# Patient Record
Sex: FEMALE | Race: WHITE | NOT HISPANIC OR LATINO | Employment: UNEMPLOYED | URBAN - METROPOLITAN AREA
[De-identification: names, ages, dates, MRNs, and addresses within clinical notes are randomized per-mention and may not be internally consistent; named-entity substitution may affect disease eponyms.]

---

## 2017-11-24 ENCOUNTER — OFFICE VISIT (OUTPATIENT)
Dept: URGENT CARE | Facility: CLINIC | Age: 22
End: 2017-11-24
Payer: COMMERCIAL

## 2017-11-24 PROCEDURE — 99213 OFFICE O/P EST LOW 20 MIN: CPT

## 2017-12-05 NOTE — PROGRESS NOTES
Assessment    1  Acute otitis media (382 9) (N78 70)    Plan  Acute otitis media    · Amoxicillin 875 MG Oral Tablet; take 1 tablet every twelve hours  Unlinked    · Anti-Diarrheal 2 MG Oral Tablet   · NyQuil LIQD   · Robitussin Cough/Cold CF LIQD   · Theraflu Severe Cold Daytime 15-5-325 MG Oral Tablet   · Ventolin 90 MCG/ACT AERS (Albuterol)    Discussion/Summary  Medication Side Effects Reviewed: Possible side effects of new medications were reviewed with the patient/guardian today  Understands and agrees with treatment plan: The treatment plan was reviewed with the patient/guardian  The patient/guardian understands and agrees with the treatment plan   Counseling Documentation With Imm: The patient, patient's family was counseled regarding instructions for management  Chief Complaint    1  Ear Pain  Chief Complaint Free Text Note Form: C/O soreness in left ear and difficulty hearing from her left ear x 2 weeks  History of Present Illness  HPI: Started with left ear pain and muffled hearing 2 weeks ago  Also having runny nose and productive cough with green mucous  no fever or chills  Hospital Based Practices Required Assessment:   Pain Assessment   the patient states they do not have pain  (on a scale of 0 to 10, the patient rates the pain at 0 )   Abuse And Domestic Violence Screen   Domestic violence screen not done today  Reason DV Screen not done: mother present    Ear Pain: Lisbeth Cummings presents with complaints of ear pain  Associated symptoms include ear plugging, ear pressure, decreased hearing, auricular pain and cough, but no ear drainage, no ear sores, no ear pruritus, no fever, no nasal congestion, no sore throat, no swollen glands, no facial pain, no headache, no tinnitus, no vertigo, no loss of balance and no nausea  Review of Systems  Focused-Female:   Constitutional: no fever and no chills  ENT: no hoarseness  Cardiovascular: no chest pain     Respiratory: no shortness of breath and no wheezing  Gastrointestinal: no abdominal pain, no nausea and no vomiting  Musculoskeletal: no myalgias  Integumentary: no rashes  Neurological: no headache and no dizziness  ROS Reviewed:   ROS reviewed  Past Medical History    1  History of Dysuria (788 1) (R30 0)   2  History of Dysuria (788 1) (R30 0)   3  History of abdominal pain (V13 89) (Z87 898)   4  History of abdominal pain (V13 89) (Z87 898)   5  History of urinary frequency (V13 09) (Z87 898)   6  Nausea (787 02) (R11 0)  Active Problems And Past Medical History Reviewed: The active problems and past medical history were reviewed and updated today  Family History  Family History    1  Family history of Crohn's Disease   2  Family history of Diabetes Mellitus (V18 0)   3  Family history of Hypertension (V17 49)    Social History    · Denied: History of Alcohol Use (History)   · Never A Smoker  Social History Reviewed: The social history was reviewed and updated today  Surgical History    1  History of Denial Of Any Significant Medical History    Current Meds   1  Anti-Diarrheal 2 MG Oral Tablet; Therapy: (Recorded:02Nov2016) to Recorded   2  CeleXA TABS; Therapy: (Recorded:24Nov2017) to Recorded   3  NuvaRing RING; Therapy: (Recorded:24Apr2014) to Recorded   4  NyQuil LIQD;   Therapy: (Recorded:02Nov2016) to Recorded   5  Robitussin Cough/Cold CF LIQD;   Therapy: (Recorded:02Nov2016) to Recorded   6  Theraflu Severe Cold Daytime 15-5-325 MG Oral Tablet; Therapy: (Recorded:02Nov2016) to Recorded   7  Ventolin 90 MCG/ACT AERS; Therapy: (Recorded:02Nov2016) to Recorded  Medication List Reviewed: The medication list was reviewed and updated today  Allergies    1   No Known Drug Allergies    Vitals  Signs   Recorded: [id number] 02:12PM   Temperature: 99 7 F  Heart Rate: 98  Respiration: 20  Systolic: 369  Diastolic: 60  Weight: 933 lb   O2 Saturation: 99  Pain Scale: 0    Physical Exam    Constitutional   General appearance: No acute distress, well appearing and well nourished  Eyes   Conjunctiva and lids: No swelling, erythema or discharge  Ears, Nose, Mouth, and Throat   External inspection of ears and nose: Normal     Otoscopic examination: Abnormal   The right tympanic membrane was red and had a diminished light reflex  The left tympanic membrane was red and had a diminished light reflex  The right external canal was normal  The left external canal was normal    Nasal mucosa, septum, and turbinates: Normal without edema or erythema  Oropharynx: Normal with no erythema, edema, exudate or lesions  Pulmonary   Respiratory effort: No increased work of breathing or signs of respiratory distress  Auscultation of lungs: Clear to auscultation  Cardiovascular   Auscultation of heart: Normal rate and rhythm, normal S1 and S2, without murmurs  Lymphatic   Palpation of lymph nodes in neck: No lymphadenopathy  Musculoskeletal   Gait and station: Normal     Skin   Skin and subcutaneous tissue: Normal without rashes or lesions      Psychiatric   Mood and affect: Normal        Signatures   Electronically signed by : CHALINO Coffman; Nov 24 2017  2:23PM EST                       (Author)

## 2018-04-24 LAB
25(OH)D3 SERPL-MCNC: 35.18 NG/ML
ALBUMIN SERPL BCP-MCNC: 4.1 G/DL (ref 3.5–5.7)
ALP SERPL-CCNC: 45 IU/L (ref 40–150)
ALT SERPL W P-5'-P-CCNC: 19 IU/L (ref 0–50)
ANION GAP SERPL CALCULATED.3IONS-SCNC: 12.1 MM/L
AST SERPL W P-5'-P-CCNC: 18 U/L (ref 7–26)
BASOPHILS # BLD AUTO: 0 X3/UL (ref 0–0.3)
BASOPHILS # BLD AUTO: 0.2 % (ref 0–2)
BILIRUB SERPL-MCNC: 0.5 MG/DL (ref 0.3–1)
BUN SERPL-MCNC: 13 MG/DL (ref 7–25)
CALCIUM SERPL-MCNC: 9.4 MG/DL (ref 8.6–10.5)
CHLORIDE SERPL-SCNC: 106 MM/L (ref 98–107)
CO2 SERPL-SCNC: 25 MM/L (ref 21–31)
CREAT SERPL-MCNC: 0.64 MG/DL (ref 0.6–1.2)
DEPRECATED RDW RBC AUTO: 18.5 %
EGFR (HISTORICAL): > 60 GFR
EGFR AFRICAN AMERICAN (HISTORICAL): > 60 GFR
EOSINOPHIL # BLD AUTO: 0 X3/UL (ref 0–0.5)
EOSINOPHIL NFR BLD AUTO: 1.2 % (ref 0–5)
FERRITIN SERPL-MCNC: 6.3 NG/ML (ref 22–322)
FOLATE SERPL-MCNC: > 20 NG/ML (ref 0.5–20)
GLUCOSE (HISTORICAL): 63 MG/DL (ref 65–99)
HCT VFR BLD AUTO: 33.9 % (ref 37–47)
HGB BLD-MCNC: 11.2 G/DL (ref 12–16)
IRON SERPL-MCNC: 49 UG/DL (ref 50–212)
LYMPHOCYTES # BLD AUTO: 1 X3/UL (ref 1.2–4.2)
LYMPHOCYTES NFR BLD AUTO: 27.8 % (ref 20.5–51.1)
MCH RBC QN AUTO: 21.9 PG (ref 26–34)
MCHC RBC AUTO-ENTMCNC: 33.1 G/DL (ref 31–37)
MCV RBC AUTO: 66.2 FL (ref 81–99)
MICROCYTOSIS (HISTORICAL): ABNORMAL
MONOCYTES # BLD AUTO: 0.3 X3/UL (ref 0–1)
MONOCYTES NFR BLD AUTO: 9.7 % (ref 1.7–12)
NEUTROPHILS # BLD AUTO: 2.2 X3/UL (ref 1.4–6.5)
NEUTS SEG NFR BLD AUTO: 61.1 % (ref 42.2–75.2)
OSMOLALITY, SERUM (HISTORICAL): 278 MOSM (ref 262–291)
OVALOCYTOSIS (HISTORICAL): SLIGHT
PLATELET # BLD AUTO: 127 X3/UL (ref 130–400)
PMV BLD AUTO: 8.5 FL
POTASSIUM SERPL-SCNC: 3.1 MM/L (ref 3.5–5.5)
RBC # BLD AUTO: 5.12 X6/UL (ref 3.9–5.2)
SODIUM SERPL-SCNC: 140 MM/L (ref 134–143)
TOTAL PROTEIN (HISTORICAL): 6.8 G/DL (ref 6.4–8.9)
VIT B12 SERPL-MCNC: 179 PG/ML (ref 180–914)
WBC # BLD AUTO: 3.5 X3/UL (ref 4.8–10.8)

## 2018-04-25 LAB — PTH-INTACT SERPL-MCNC: 16 PG/ML (ref 15–65)

## 2018-04-26 LAB — ZINC (HISTORICAL): 71 UG/DL (ref 56–134)

## 2018-04-28 LAB — VITAMIN B1, WHOLE BLOOD (HISTORICAL): 110.1 NMOL/ (ref 66.5–200)

## 2018-05-01 LAB — VITAMIN A LEVEL (HISTORICAL): 49.6 UG/DL (ref 31.2–89.1)

## 2019-01-21 ENCOUNTER — APPOINTMENT (OUTPATIENT)
Dept: LAB | Facility: HOSPITAL | Age: 24
End: 2019-01-21
Payer: COMMERCIAL

## 2019-01-21 ENCOUNTER — TRANSCRIBE ORDERS (OUTPATIENT)
Dept: ADMINISTRATIVE | Facility: HOSPITAL | Age: 24
End: 2019-01-21

## 2019-01-21 DIAGNOSIS — D69.6 THROMBOCYTOPENIA, UNSPECIFIED (HCC): ICD-10-CM

## 2019-01-21 DIAGNOSIS — D69.6 THROMBOCYTOPENIA, UNSPECIFIED (HCC): Primary | ICD-10-CM

## 2019-01-21 LAB
EOSINOPHIL # BLD AUTO: 0.05 THOUSAND/UL (ref 0–0.61)
EOSINOPHIL NFR BLD MANUAL: 2 % (ref 0–6)
ERYTHROCYTE [DISTWIDTH] IN BLOOD BY AUTOMATED COUNT: 15.2 % (ref 11.6–15.1)
HCT VFR BLD AUTO: 33.5 % (ref 37–47)
HGB BLD-MCNC: 10.8 G/DL (ref 11.5–15.4)
LYMPHOCYTES # BLD AUTO: 1.01 THOUSAND/UL (ref 0.6–4.47)
LYMPHOCYTES # BLD AUTO: 44 % (ref 20–51)
MCH RBC QN AUTO: 22.5 PG (ref 26.8–34.3)
MCHC RBC AUTO-ENTMCNC: 32.3 G/DL (ref 31.4–37.4)
MCV RBC AUTO: 70 FL (ref 82–98)
MICROCYTES BLD QL AUTO: PRESENT
MONOCYTES # BLD AUTO: 0.14 THOUSAND/UL (ref 0–1.22)
MONOCYTES NFR BLD AUTO: 6 % (ref 4–12)
NEUTS BAND NFR BLD MANUAL: 4 % (ref 0–8)
NEUTS SEG # BLD: 1.1 THOUSAND/UL (ref 1.81–6.82)
NEUTS SEG NFR BLD AUTO: 44 % (ref 42–75)
NRBC BLD AUTO-RTO: 0 /100 WBCS
OVALOCYTES BLD QL SMEAR: PRESENT
PLATELET # BLD AUTO: 116 THOUSANDS/UL (ref 149–390)
PLATELET BLD QL SMEAR: ABNORMAL
PMV BLD AUTO: 7.3 FL (ref 8.9–12.7)
RBC # BLD AUTO: 4.81 MILLION/UL (ref 3.81–5.12)
TOTAL CELLS COUNTED SPEC: 100
WBC # BLD AUTO: 2.3 THOUSAND/UL (ref 4.31–10.16)

## 2019-01-21 PROCEDURE — 85027 COMPLETE CBC AUTOMATED: CPT

## 2019-01-21 PROCEDURE — 85007 BL SMEAR W/DIFF WBC COUNT: CPT

## 2019-01-21 PROCEDURE — 36415 COLL VENOUS BLD VENIPUNCTURE: CPT

## 2019-03-23 ENCOUNTER — TRANSCRIBE ORDERS (OUTPATIENT)
Dept: ADMINISTRATIVE | Facility: HOSPITAL | Age: 24
End: 2019-03-23

## 2019-03-23 ENCOUNTER — APPOINTMENT (OUTPATIENT)
Dept: LAB | Facility: HOSPITAL | Age: 24
End: 2019-03-23
Payer: COMMERCIAL

## 2019-03-23 DIAGNOSIS — R16.1 SPLENOMEGALY: Primary | ICD-10-CM

## 2019-03-23 DIAGNOSIS — R16.1 SPLENOMEGALY: ICD-10-CM

## 2019-03-23 DIAGNOSIS — K76.0 FATTY METAMORPHOSIS OF LIVER: ICD-10-CM

## 2019-03-23 LAB
ALBUMIN SERPL BCP-MCNC: 3.9 G/DL (ref 3.5–5.7)
ALP SERPL-CCNC: 61 U/L (ref 40–150)
ALT SERPL W P-5'-P-CCNC: 25 U/L (ref 7–52)
ANION GAP SERPL CALCULATED.3IONS-SCNC: 9 MMOL/L (ref 4–13)
ANISOCYTOSIS BLD QL SMEAR: PRESENT
AST SERPL W P-5'-P-CCNC: 21 U/L (ref 13–39)
BILIRUB SERPL-MCNC: 0.5 MG/DL (ref 0.2–1)
BUN SERPL-MCNC: 13 MG/DL (ref 7–25)
CALCIUM SERPL-MCNC: 9.3 MG/DL (ref 8.6–10.5)
CHLORIDE SERPL-SCNC: 105 MMOL/L (ref 98–107)
CO2 SERPL-SCNC: 27 MMOL/L (ref 21–31)
CREAT SERPL-MCNC: 0.68 MG/DL (ref 0.6–1.2)
EOSINOPHIL # BLD AUTO: 0.04 THOUSAND/UL (ref 0–0.61)
EOSINOPHIL NFR BLD MANUAL: 2 % (ref 0–6)
ERYTHROCYTE [DISTWIDTH] IN BLOOD BY AUTOMATED COUNT: 21.3 % (ref 11.5–14.5)
FERRITIN SERPL-MCNC: 182 NG/ML (ref 8–388)
GFR SERPL CREATININE-BSD FRML MDRD: 125 ML/MIN/1.73SQ M
GLUCOSE SERPL-MCNC: 94 MG/DL (ref 65–99)
HCT VFR BLD AUTO: 40.7 % (ref 42–47)
HGB BLD-MCNC: 13.4 G/DL (ref 12–16)
INR PPP: 1.09 (ref 0.9–1.5)
LYMPHOCYTES # BLD AUTO: 0.64 THOUSAND/UL (ref 0.6–4.47)
LYMPHOCYTES # BLD AUTO: 29 % (ref 20–51)
MCH RBC QN AUTO: 24.9 PG (ref 26–34)
MCHC RBC AUTO-ENTMCNC: 32.9 G/DL (ref 31–37)
MCV RBC AUTO: 76 FL (ref 81–99)
MONOCYTES # BLD AUTO: 0.22 THOUSAND/UL (ref 0–1.22)
MONOCYTES NFR BLD AUTO: 10 % (ref 4–12)
NEUTS SEG # BLD: 1.3 THOUSAND/UL (ref 1.81–6.82)
NEUTS SEG NFR BLD AUTO: 59 % (ref 42–75)
PLATELET # BLD AUTO: 97 THOUSANDS/UL (ref 149–390)
PLATELET BLD QL SMEAR: ABNORMAL
PMV BLD AUTO: 8.5 FL (ref 8.6–11.7)
POIKILOCYTOSIS BLD QL SMEAR: PRESENT
POTASSIUM SERPL-SCNC: 3.7 MMOL/L (ref 3.5–5.5)
PROT SERPL-MCNC: 6.5 G/DL (ref 6.4–8.9)
PROTHROMBIN TIME: 12.6 SECONDS (ref 10.2–13)
RBC # BLD AUTO: 5.37 MILLION/UL (ref 3.9–5.2)
RBC MORPH BLD: ABNORMAL
SCHISTOCYTES BLD QL SMEAR: PRESENT
SODIUM SERPL-SCNC: 141 MMOL/L (ref 134–143)
TIBC SERPL-MCNC: 418 UG/DL (ref 250–450)
TOTAL CELLS COUNTED SPEC: 100
VIT B12 SERPL-MCNC: 238 PG/ML (ref 100–900)
WBC # BLD AUTO: 2.2 THOUSAND/UL (ref 4.8–10.8)

## 2019-03-23 PROCEDURE — 82607 VITAMIN B-12: CPT

## 2019-03-23 PROCEDURE — 82728 ASSAY OF FERRITIN: CPT

## 2019-03-23 PROCEDURE — 82103 ALPHA-1-ANTITRYPSIN TOTAL: CPT

## 2019-03-23 PROCEDURE — 86235 NUCLEAR ANTIGEN ANTIBODY: CPT

## 2019-03-23 PROCEDURE — 85245 CLOT FACTOR VIII VW RISTOCTN: CPT

## 2019-03-23 PROCEDURE — 80053 COMPREHEN METABOLIC PANEL: CPT

## 2019-03-23 PROCEDURE — 86803 HEPATITIS C AB TEST: CPT

## 2019-03-23 PROCEDURE — 82390 ASSAY OF CERULOPLASMIN: CPT

## 2019-03-23 PROCEDURE — 36415 COLL VENOUS BLD VENIPUNCTURE: CPT

## 2019-03-23 PROCEDURE — 85610 PROTHROMBIN TIME: CPT

## 2019-03-23 PROCEDURE — 87340 HEPATITIS B SURFACE AG IA: CPT

## 2019-03-23 PROCEDURE — 86038 ANTINUCLEAR ANTIBODIES: CPT

## 2019-03-23 PROCEDURE — 85027 COMPLETE CBC AUTOMATED: CPT

## 2019-03-23 PROCEDURE — 86256 FLUORESCENT ANTIBODY TITER: CPT

## 2019-03-23 PROCEDURE — 83550 IRON BINDING TEST: CPT

## 2019-03-23 PROCEDURE — 85007 BL SMEAR W/DIFF WBC COUNT: CPT

## 2019-03-24 LAB
HBV SURFACE AG SER QL: NORMAL
HCV AB SER QL: NORMAL

## 2019-03-25 LAB — RYE IGE QN: NEGATIVE

## 2019-03-26 LAB
A1AT SERPL-MCNC: 172 MG/DL (ref 90–200)
ACTIN IGG SERPL-ACNC: 7 UNITS (ref 0–19)
CERULOPLASMIN SERPL-MCNC: 42 MG/DL (ref 19–39)
MITOCHONDRIA M2 IGG SER-ACNC: <20 UNITS (ref 0–20)

## 2019-03-27 LAB — VWF:RCO ACT/NOR PPP PL AGG: 222 % (ref 50–200)

## 2019-08-16 ENCOUNTER — APPOINTMENT (OUTPATIENT)
Dept: LAB | Facility: HOSPITAL | Age: 24
End: 2019-08-16
Payer: COMMERCIAL

## 2019-08-16 ENCOUNTER — TRANSCRIBE ORDERS (OUTPATIENT)
Dept: LAB | Facility: HOSPITAL | Age: 24
End: 2019-08-16

## 2019-08-16 DIAGNOSIS — E55.9 VITAMIN D DEFICIENCY: ICD-10-CM

## 2019-08-16 DIAGNOSIS — E03.9 HYPOTHYROIDISM, UNSPECIFIED TYPE: ICD-10-CM

## 2019-08-16 DIAGNOSIS — D69.6 THROMBOCYTOPENIA, UNSPECIFIED (HCC): ICD-10-CM

## 2019-08-16 DIAGNOSIS — I81 CAVERNOUS TRANSFORMATION OF PORTAL VEIN: ICD-10-CM

## 2019-08-16 DIAGNOSIS — I85.00 ESOPHAGEAL VARICES DETERMINED BY ENDOSCOPY (HCC): ICD-10-CM

## 2019-08-16 DIAGNOSIS — D50.8 IRON DEFICIENCY ANEMIA SECONDARY TO INADEQUATE DIETARY IRON INTAKE: ICD-10-CM

## 2019-08-16 DIAGNOSIS — D64.9 ANEMIA, UNSPECIFIED TYPE: ICD-10-CM

## 2019-08-16 DIAGNOSIS — E53.8 VITAMIN B12 DEFICIENCY (NON ANEMIC): ICD-10-CM

## 2019-08-16 DIAGNOSIS — I85.00 ESOPHAGEAL VARICES DETERMINED BY ENDOSCOPY (HCC): Primary | ICD-10-CM

## 2019-08-16 LAB
25(OH)D3 SERPL-MCNC: 21 NG/ML (ref 30–100)
ALBUMIN SERPL BCP-MCNC: 3.8 G/DL (ref 3.5–5.7)
ALP SERPL-CCNC: 49 U/L (ref 40–150)
ALT SERPL W P-5'-P-CCNC: 18 U/L (ref 7–52)
ANION GAP SERPL CALCULATED.3IONS-SCNC: 5 MMOL/L (ref 4–13)
AST SERPL W P-5'-P-CCNC: 16 U/L (ref 13–39)
BASOPHILS # BLD AUTO: 0 THOUSANDS/ΜL (ref 0–0.1)
BASOPHILS NFR BLD AUTO: 0 % (ref 0–2)
BILIRUB SERPL-MCNC: 0.7 MG/DL (ref 0.2–1)
BUN SERPL-MCNC: 12 MG/DL (ref 7–25)
CALCIUM SERPL-MCNC: 9.1 MG/DL (ref 8.6–10.5)
CHLORIDE SERPL-SCNC: 105 MMOL/L (ref 98–107)
CO2 SERPL-SCNC: 27 MMOL/L (ref 21–31)
CREAT SERPL-MCNC: 0.69 MG/DL (ref 0.6–1.2)
EOSINOPHIL # BLD AUTO: 0.1 THOUSAND/ΜL (ref 0–0.61)
EOSINOPHIL NFR BLD AUTO: 2 % (ref 0–5)
ERYTHROCYTE [DISTWIDTH] IN BLOOD BY AUTOMATED COUNT: 14.8 % (ref 11.5–14.5)
FERRITIN SERPL-MCNC: 132 NG/ML (ref 8–388)
GFR SERPL CREATININE-BSD FRML MDRD: 124 ML/MIN/1.73SQ M
GLUCOSE P FAST SERPL-MCNC: 84 MG/DL (ref 65–99)
HCT VFR BLD AUTO: 37.7 % (ref 42–47)
HGB BLD-MCNC: 12.7 G/DL (ref 12–16)
IRON SERPL-MCNC: 59 UG/DL (ref 50–170)
LYMPHOCYTES # BLD AUTO: 0.9 THOUSANDS/ΜL (ref 0.6–4.47)
LYMPHOCYTES NFR BLD AUTO: 29 % (ref 21–51)
MCH RBC QN AUTO: 26.3 PG (ref 26–34)
MCHC RBC AUTO-ENTMCNC: 33.7 G/DL (ref 31–37)
MCV RBC AUTO: 78 FL (ref 81–99)
MICROCYTES BLD QL AUTO: PRESENT
MONOCYTES # BLD AUTO: 0.2 THOUSAND/ΜL (ref 0.17–1.22)
MONOCYTES NFR BLD AUTO: 8 % (ref 2–12)
NEUTROPHILS # BLD AUTO: 1.9 THOUSANDS/ΜL (ref 1.4–6.5)
NEUTS SEG NFR BLD AUTO: 61 % (ref 42–75)
OVALOCYTES BLD QL SMEAR: PRESENT
PLATELET # BLD AUTO: 95 THOUSANDS/UL (ref 149–390)
PLATELET BLD QL SMEAR: ABNORMAL
PMV BLD AUTO: 7.5 FL (ref 8.6–11.7)
POTASSIUM SERPL-SCNC: 4.1 MMOL/L (ref 3.5–5.5)
PROT SERPL-MCNC: 6.3 G/DL (ref 6.4–8.9)
RBC # BLD AUTO: 4.83 MILLION/UL (ref 3.9–5.2)
RBC MORPH BLD: PRESENT
SODIUM SERPL-SCNC: 137 MMOL/L (ref 134–143)
T4 FREE SERPL-MCNC: 1.17 NG/DL (ref 0.76–1.46)
TIBC SERPL-MCNC: 385 UG/DL (ref 250–450)
TSH SERPL DL<=0.05 MIU/L-ACNC: 1.93 UIU/ML (ref 0.45–5.33)
VIT B12 SERPL-MCNC: 250 PG/ML (ref 100–900)
WBC # BLD AUTO: 3.2 THOUSAND/UL (ref 4.8–10.8)

## 2019-08-16 PROCEDURE — 84443 ASSAY THYROID STIM HORMONE: CPT

## 2019-08-16 PROCEDURE — 85025 COMPLETE CBC W/AUTO DIFF WBC: CPT

## 2019-08-16 PROCEDURE — 82607 VITAMIN B-12: CPT

## 2019-08-16 PROCEDURE — 82747 ASSAY OF FOLIC ACID RBC: CPT

## 2019-08-16 PROCEDURE — 82306 VITAMIN D 25 HYDROXY: CPT

## 2019-08-16 PROCEDURE — 36415 COLL VENOUS BLD VENIPUNCTURE: CPT

## 2019-08-16 PROCEDURE — 83540 ASSAY OF IRON: CPT

## 2019-08-16 PROCEDURE — 80053 COMPREHEN METABOLIC PANEL: CPT

## 2019-08-16 PROCEDURE — 83550 IRON BINDING TEST: CPT

## 2019-08-16 PROCEDURE — 84439 ASSAY OF FREE THYROXINE: CPT

## 2019-08-16 PROCEDURE — 82728 ASSAY OF FERRITIN: CPT

## 2019-08-20 LAB
FOLATE BLD-MCNC: 403.4 NG/ML
FOLATE RBC-MCNC: 1059 NG/ML
HCT VFR BLD AUTO: 38.1 % (ref 34–46.6)

## 2019-10-04 ENCOUNTER — OFFICE VISIT (OUTPATIENT)
Dept: URGENT CARE | Facility: HOSPITAL | Age: 24
End: 2019-10-04
Payer: COMMERCIAL

## 2019-10-04 VITALS
DIASTOLIC BLOOD PRESSURE: 55 MMHG | BODY MASS INDEX: 32.39 KG/M2 | TEMPERATURE: 98.6 F | HEIGHT: 60 IN | OXYGEN SATURATION: 98 % | WEIGHT: 165 LBS | HEART RATE: 81 BPM | RESPIRATION RATE: 18 BRPM | SYSTOLIC BLOOD PRESSURE: 101 MMHG

## 2019-10-04 DIAGNOSIS — R10.9 ABDOMINAL PAIN, UNSPECIFIED ABDOMINAL LOCATION: Primary | ICD-10-CM

## 2019-10-04 PROCEDURE — 99203 OFFICE O/P NEW LOW 30 MIN: CPT | Performed by: PHYSICIAN ASSISTANT

## 2019-10-04 RX ORDER — LORAZEPAM 1 MG/1
1 TABLET ORAL 2 TIMES DAILY PRN
Refills: 0 | COMMUNITY
Start: 2019-06-27

## 2019-10-04 RX ORDER — ETONOGESTREL/ETHINYL ESTRADIOL .12-.015MG
RING, VAGINAL VAGINAL
Refills: 0 | COMMUNITY
Start: 2019-08-02

## 2019-10-04 RX ORDER — NADOLOL 40 MG/1
TABLET ORAL
Refills: 0 | COMMUNITY
Start: 2019-08-02

## 2019-10-04 NOTE — PATIENT INSTRUCTIONS
Cecilio diffusely tender  She is tender over the epigastrium and some over the right upper quadrant  Concern for gallbladder  Recommend emergency room evaluation for advanced imaging and lab work not available at this facility

## 2019-10-04 NOTE — PROGRESS NOTES
[facility]        NAME: Luis Eduardo Powell is a 27 y o  female  : 1995    MRN: [id number]  DATE: 2019  TIME: 4:15 PM    Assessment and Plan   Abdominal pain, unspecified abdominal location [R10 9]  1  Abdominal pain, unspecified abdominal location  Transfer to other facility         Patient Instructions     Patient Instructions   Ashly Tyson diffusely tender  She is tender over the epigastrium and some over the right upper quadrant  Concern for gallbladder  Recommend emergency room evaluation for advanced imaging and lab work not available at this facility  Follow up with PCP in 3-5 days  Proceed to  ER if symptoms worsen  Chief Complaint     Chief Complaint   Patient presents with    Abdominal Pain     Possably gallbladder         History of Present Illness         14-year-old female complains of 1 week of progressive abdominal pain more in the right upper quadrant  She has little pain when she wakes up  He gets a little bit better when she eats  She has pain radiated to the right shoulder and down the right flank  No numbness or tingling in her legs  No leg pain  She has intermittent diarrhea  History of gastric sleeve  She has some nausea but no vomiting  She denies blood in her stool  She is currently on her menstrual cycle so she is unsure if she has blood in her urine  No burning or frequency with urination  No medicines over-the-counter for this  Review of Systems   Review of Systems   Constitutional: Negative  HENT: Negative  Respiratory: Negative  Gastrointestinal: Positive for abdominal pain, diarrhea and nausea  Negative for blood in stool and vomiting           Current Medications       Current Outpatient Medications:     LORazepam (ATIVAN) 1 mg tablet, 1 mg 2 (two) times a day as needed, Disp: , Rfl: 0    nadolol (CORGARD) 40 mg tablet, , Disp: , Rfl: 0    NUVARING 0 12-0 015 MG/24HR vaginal ring, , Disp: , Rfl: 0    OMEPRAZOLE PO, 20 mg, Disp: , Rfl:     Vortioxetine HBr (TRINTELLIX) 5 MG tablet, Take 5 mg by mouth daily, Disp: , Rfl:     Current Allergies     Allergies as of 10/04/2019    (No Known Allergies)            The following portions of the patient's history were reviewed and updated as appropriate: allergies, current medications, past family history, past medical history, past social history, past surgical history and problem list      Past Medical History:   Diagnosis Date    Portal vein thrombosis        Past Surgical History:   Procedure Laterality Date    GASTRIC BYPASS         Family History   Problem Relation Age of Onset    Hypertension Mother     Diabetes Mother     Crohn's disease Mother     No Known Problems Father          Medications have been verified  Objective   /55   Pulse 81   Temp 98 6 °F (37 °C)   Resp 18   Ht 5' (1 524 m)   Wt 74 8 kg (165 lb)   SpO2 98%   BMI 32 22 kg/m²        Physical Exam     Physical Exam   Constitutional: She is oriented to person, place, and time  She appears well-developed and well-nourished  Cardiovascular: Normal rate and regular rhythm  Pulmonary/Chest: Effort normal and breath sounds normal    Abdominal: Soft  Bowel sounds are normal  She exhibits no distension  There is tenderness in the right upper quadrant and epigastric area  There is CVA tenderness (R side) and positive Everett's sign  There is no rigidity and no guarding  Neurological: She is alert and oriented to person, place, and time  Skin: Skin is warm and dry

## 2019-11-30 ENCOUNTER — OFFICE VISIT (OUTPATIENT)
Dept: URGENT CARE | Facility: HOSPITAL | Age: 24
End: 2019-11-30
Payer: COMMERCIAL

## 2019-11-30 VITALS
OXYGEN SATURATION: 98 % | DIASTOLIC BLOOD PRESSURE: 49 MMHG | HEIGHT: 59 IN | TEMPERATURE: 100 F | BODY MASS INDEX: 33.06 KG/M2 | SYSTOLIC BLOOD PRESSURE: 102 MMHG | HEART RATE: 72 BPM | WEIGHT: 164 LBS

## 2019-11-30 DIAGNOSIS — L50.9 URTICARIA: Primary | ICD-10-CM

## 2019-11-30 PROCEDURE — 96372 THER/PROPH/DIAG INJ SC/IM: CPT | Performed by: NURSE PRACTITIONER

## 2019-11-30 PROCEDURE — 99213 OFFICE O/P EST LOW 20 MIN: CPT | Performed by: NURSE PRACTITIONER

## 2019-11-30 RX ORDER — DIPHENHYDRAMINE HYDROCHLORIDE 50 MG/ML
25 INJECTION INTRAMUSCULAR; INTRAVENOUS ONCE
Status: COMPLETED | OUTPATIENT
Start: 2019-11-30 | End: 2019-11-30

## 2019-11-30 RX ADMIN — DIPHENHYDRAMINE HYDROCHLORIDE 25 MG: 50 INJECTION INTRAMUSCULAR; INTRAVENOUS at 16:04

## 2019-11-30 NOTE — PATIENT INSTRUCTIONS
Continue over-the-counter Benadryl as directed  Recommend oatmeal baths for comfort  Can use OTC Benadryl cream to affected areas  If you develop any lip or tongue swelling, facial swelling, difficulty breathing or swallowing, or any new or concerning symptoms please return or proceed ER  Recommend following up with PCP in 1-2 days  Urticaria   WHAT YOU NEED TO KNOW:   Urticaria is also called hives  Hives can change size and shape, and appear anywhere on your skin  They can be mild or severe and last from a few minutes to a few days  Hives may be a sign of a severe allergic reaction called anaphylaxis that needs immediate treatment  Urticaria that lasts longer than 6 weeks may be a chronic condition that needs long-term treatment  DISCHARGE INSTRUCTIONS:   Call 911 for signs or symptoms of anaphylaxis,  such as trouble breathing, swelling in your mouth or throat, or wheezing  You may also have itching, a rash, or feel like you are going to faint  Return to the emergency department if:   · Your heart is beating faster than it normally does  · You have cramping or severe pain in your abdomen  Contact your healthcare provider if:   · You have a fever  · Your skin still itches 24 hours after you take your medicine  · You still have hives after 7 days  · Your joints are painful and swollen  · You have questions or concerns about your condition or care  Medicines:   · Epinephrine  is used to treat severe allergic reactions such as anaphylaxis  · Antihistamines  decrease mild symptoms such as itching or a rash  · Steroids  decrease redness, pain, and swelling  · Take your medicine as directed  Contact your healthcare provider if you think your medicine is not helping or if you have side effects  Tell him of her if you are allergic to any medicine  Keep a list of the medicines, vitamins, and herbs you take  Include the amounts, and when and why you take them   Bring the list or the pill bottles to follow-up visits  Carry your medicine list with you in case of an emergency  Steps to take for signs or symptoms of anaphylaxis:   · Immediately  give 1 shot of epinephrine only into the outer thigh muscle  · Leave the shot in place  as directed  Your healthcare provider may recommend you leave it in place for up to 10 seconds before you remove it  This helps make sure all of the epinephrine is delivered  · Call 911 and go to the emergency department,  even if the shot improved symptoms  Do not drive yourself  Bring the used epinephrine shot with you  Safety precautions to take if you are at risk for anaphylaxis:   · Keep 2 shots of epinephrine with you at all times  You may need a second shot, because epinephrine only works for about 20 minutes and symptoms may return  Your healthcare provider can show you and family members how to give the shot  Check the expiration date every month and replace it before it expires  · Create an action plan  Your healthcare provider can help you create a written plan that explains the allergy and an emergency plan to treat a reaction  The plan explains when to give a second epinephrine shot if symptoms return or do not improve after the first  Give copies of the action plan and emergency instructions to family members, work and school staff, and  providers  Show them how to give a shot of epinephrine  · Be careful when you exercise  If you have had exercise-induced anaphylaxis, do not exercise right after you eat  Stop exercising right away if you start to develop any signs or symptoms of anaphylaxis  You may first feel tired, warm, or have itchy skin  Hives, swelling, and severe breathing problems may develop if you continue to exercise  · Carry medical alert identification  Wear medical alert jewelry or carry a card that explains the allergy  Ask your healthcare provider where to get these items       · Keep a record of triggers and symptoms  Record everything you eat, drink, or apply to your skin for 3 weeks  Include stressful events and what you were doing right before your hives started  Bring the record with you to follow-up visits with your healthcare provider  Manage urticaria:   · Cool your skin  This may help decrease itching  Apply a cool pack to your hives  Dip a hand towel in cool water, wring it out, and place it on your hives  You may also soak your skin in a cool oatmeal bath  · Do not rub your hives  This can irritate your skin and cause more hives  · Wear loose clothing  Tight clothes may irritate your skin and cause more hives  · Manage stress  Stress may trigger hives, or make them worse  Learn new ways to relax, such as deep breathing  Follow up with your healthcare provider as directed:  Write down your questions so you remember to ask them during your visits  © 2017 [employer] Information is for End User's use only and may not be sold, redistributed or otherwise used for commercial purposes  All illustrations and images included in CareNotes® are the copyrighted property of [employer]  or [employer]  The above information is an  only  It is not intended as medical advice for individual conditions or treatments  Talk to your doctor, nurse or pharmacist before following any medical regimen to see if it is safe and effective for you

## 2019-12-01 NOTE — PROGRESS NOTES
[facility]        NAME: Farrukh Diego is a 27 y o  female  : 1995    MRN: [id number]  DATE: 2019  TIME: 10:09 AM    Assessment and Plan   Urticaria [L50 9]  1  Urticaria  diphenhydrAMINE (BENADRYL) injection 25 mg     Patient requesting "a shot for the itching " took 25mg of PO benadryl approx 2 hours PTA  Discussed that because of her liver issues, steroids are contraindicated  Patient states " how much more damage can you do?" advised to f/u with PCP and proceed to ER with any worsening symptoms  Patient Instructions     Patient Instructions     Continue over-the-counter Benadryl as directed  Recommend oatmeal baths for comfort  Can use OTC Benadryl cream to affected areas  If you develop any lip or tongue swelling, facial swelling, difficulty breathing or swallowing, or any new or concerning symptoms please return or proceed ER  Recommend following up with PCP in 1-2 days  Urticaria   WHAT YOU NEED TO KNOW:   Urticaria is also called hives  Hives can change size and shape, and appear anywhere on your skin  They can be mild or severe and last from a few minutes to a few days  Hives may be a sign of a severe allergic reaction called anaphylaxis that needs immediate treatment  Urticaria that lasts longer than 6 weeks may be a chronic condition that needs long-term treatment  DISCHARGE INSTRUCTIONS:   Call 911 for signs or symptoms of anaphylaxis,  such as trouble breathing, swelling in your mouth or throat, or wheezing  You may also have itching, a rash, or feel like you are going to faint  Return to the emergency department if:   · Your heart is beating faster than it normally does  · You have cramping or severe pain in your abdomen  Contact your healthcare provider if:   · You have a fever  · Your skin still itches 24 hours after you take your medicine  · You still have hives after 7 days  · Your joints are painful and swollen      · You have questions or concerns about your condition or care  Medicines:   · Epinephrine  is used to treat severe allergic reactions such as anaphylaxis  · Antihistamines  decrease mild symptoms such as itching or a rash  · Steroids  decrease redness, pain, and swelling  · Take your medicine as directed  Contact your healthcare provider if you think your medicine is not helping or if you have side effects  Tell him of her if you are allergic to any medicine  Keep a list of the medicines, vitamins, and herbs you take  Include the amounts, and when and why you take them  Bring the list or the pill bottles to follow-up visits  Carry your medicine list with you in case of an emergency  Steps to take for signs or symptoms of anaphylaxis:   · Immediately  give 1 shot of epinephrine only into the outer thigh muscle  · Leave the shot in place  as directed  Your healthcare provider may recommend you leave it in place for up to 10 seconds before you remove it  This helps make sure all of the epinephrine is delivered  · Call 911 and go to the emergency department,  even if the shot improved symptoms  Do not drive yourself  Bring the used epinephrine shot with you  Safety precautions to take if you are at risk for anaphylaxis:   · Keep 2 shots of epinephrine with you at all times  You may need a second shot, because epinephrine only works for about 20 minutes and symptoms may return  Your healthcare provider can show you and family members how to give the shot  Check the expiration date every month and replace it before it expires  · Create an action plan  Your healthcare provider can help you create a written plan that explains the allergy and an emergency plan to treat a reaction  The plan explains when to give a second epinephrine shot if symptoms return or do not improve after the first  Give copies of the action plan and emergency instructions to family members, work and school staff, and  providers  Show them how to give a shot of epinephrine  · Be careful when you exercise  If you have had exercise-induced anaphylaxis, do not exercise right after you eat  Stop exercising right away if you start to develop any signs or symptoms of anaphylaxis  You may first feel tired, warm, or have itchy skin  Hives, swelling, and severe breathing problems may develop if you continue to exercise  · Carry medical alert identification  Wear medical alert jewelry or carry a card that explains the allergy  Ask your healthcare provider where to get these items  · Keep a record of triggers and symptoms  Record everything you eat, drink, or apply to your skin for 3 weeks  Include stressful events and what you were doing right before your hives started  Bring the record with you to follow-up visits with your healthcare provider  Manage urticaria:   · Cool your skin  This may help decrease itching  Apply a cool pack to your hives  Dip a hand towel in cool water, wring it out, and place it on your hives  You may also soak your skin in a cool oatmeal bath  · Do not rub your hives  This can irritate your skin and cause more hives  · Wear loose clothing  Tight clothes may irritate your skin and cause more hives  · Manage stress  Stress may trigger hives, or make them worse  Learn new ways to relax, such as deep breathing  Follow up with your healthcare provider as directed:  Write down your questions so you remember to ask them during your visits  © 2017 [employer] Information is for End User's use only and may not be sold, redistributed or otherwise used for commercial purposes  All illustrations and images included in CareNotes® are the copyrighted property of [employer]  or [employer]  The above information is an  only  It is not intended as medical advice for individual conditions or treatments   Talk to your doctor, nurse or pharmacist before following any medical regimen to see if it is safe and effective for you  Follow up with PCP in 3-5 days  Proceed to  ER if symptoms worsen  Chief Complaint     Chief Complaint   Patient presents with    Urticaria     Started about two days ago, all over body  History of Present Illness       Patient is a 68-year-old female presents with a 2 day history of a rash  Patient notes rash began on her neck and spread to her bilateral arms  Patient is complaining of pruritus  Patient denies any new exposures including foods,detergents, or soaps  Does states that she dyed her hair approximately 1 week ago but did not develop a rash until 2 days ago  Patient has a significant past medical history of esophageal varices and portal vein thrombosis  States that she was just discharged from a recent admission 9 days ago  Patient offers no other complaints at this time  Patient denies any fever, chills, or body aches  Patient denies any tongue or lip swelling, denies any throat swelling, difficulty breathing, swallowing, or maintaining secretions  Patient denies any shortness of breath, chest pain, or palpitations  Patient denies any abdominal pain, nausea, vomiting or diarrhea  Has tried over-the-counter Benadryl and calamine lotion with moderate relief  Denies any known allergies  Review of Systems   Review of Systems   Constitutional: Negative for chills, diaphoresis, fatigue and fever  HENT: Negative  Negative for facial swelling and trouble swallowing  Eyes: Negative  Respiratory: Negative for cough, choking, chest tightness, shortness of breath, wheezing and stridor  Cardiovascular: Negative for chest pain and palpitations  Gastrointestinal: Negative for abdominal pain, constipation, diarrhea, nausea and vomiting  Musculoskeletal: Negative  Skin: Positive for rash  Neurological: Negative            Current Medications       Current Outpatient Medications:     LORazepam (ATIVAN) 1 mg tablet, 1 mg 2 (two) times a day as needed, Disp: , Rfl: 0    nadolol (CORGARD) 40 mg tablet, , Disp: , Rfl: 0    NUVARING 0 12-0 015 MG/24HR vaginal ring, , Disp: , Rfl: 0    OMEPRAZOLE PO, 20 mg, Disp: , Rfl:     Vortioxetine HBr (TRINTELLIX) 5 MG tablet, Take 5 mg by mouth daily, Disp: , Rfl:   No current facility-administered medications for this visit  Current Allergies     Allergies as of 11/30/2019    (No Known Allergies)            The following portions of the patient's history were reviewed and updated as appropriate: allergies, current medications, past family history, past medical history, past social history, past surgical history and problem list      Past Medical History:   Diagnosis Date    Portal vein thrombosis        Past Surgical History:   Procedure Laterality Date    GASTRIC BYPASS         Family History   Problem Relation Age of Onset    Hypertension Mother     Diabetes Mother     Crohn's disease Mother     No Known Problems Father          Medications have been verified  Objective   BP (!) 102/49 (BP Location: Left arm, Patient Position: Sitting, Cuff Size: Standard)   Pulse 72   Temp 100 °F (37 8 °C) (Temporal)   Ht 4' 11" (1 499 m)   Wt 74 4 kg (164 lb)   SpO2 98%   BMI 33 12 kg/m²        Physical Exam     Physical Exam   Constitutional: She is oriented to person, place, and time  She appears well-developed and well-nourished  No distress  HENT:   Head: Normocephalic and atraumatic  Mouth/Throat: Uvula is midline, oropharynx is clear and moist and mucous membranes are normal  No tonsillar exudate  Cardiovascular: Normal rate, regular rhythm, S1 normal, S2 normal, normal heart sounds, intact distal pulses and normal pulses  Pulmonary/Chest: Effort normal and breath sounds normal    Neurological: She is alert and oriented to person, place, and time  GCS eye subscore is 4  GCS verbal subscore is 5  GCS motor subscore is 6  Skin: Skin is warm and dry  Capillary refill takes less than 2 seconds  Rash noted  Rash is urticarial (on neck and bilateral arms  blanchable  no swelling, streaking, or drainage  )  She is not diaphoretic

## 2020-01-06 ENCOUNTER — TRANSCRIBE ORDERS (OUTPATIENT)
Dept: ADMINISTRATIVE | Facility: HOSPITAL | Age: 25
End: 2020-01-06

## 2020-01-06 DIAGNOSIS — R10.11 ABDOMINAL PAIN, RIGHT UPPER QUADRANT: Primary | ICD-10-CM

## 2020-01-14 ENCOUNTER — APPOINTMENT (OUTPATIENT)
Dept: LAB | Facility: HOSPITAL | Age: 25
End: 2020-01-14
Payer: COMMERCIAL

## 2020-01-14 ENCOUNTER — TRANSCRIBE ORDERS (OUTPATIENT)
Dept: LAB | Facility: HOSPITAL | Age: 25
End: 2020-01-14

## 2020-01-14 ENCOUNTER — HOSPITAL ENCOUNTER (OUTPATIENT)
Dept: ULTRASOUND IMAGING | Facility: HOSPITAL | Age: 25
Discharge: HOME | End: 2020-01-14
Payer: COMMERCIAL

## 2020-01-14 DIAGNOSIS — R10.11 ABDOMINAL PAIN, RIGHT UPPER QUADRANT: ICD-10-CM

## 2020-01-14 DIAGNOSIS — D64.9 ANEMIA, UNSPECIFIED TYPE: ICD-10-CM

## 2020-01-14 DIAGNOSIS — E53.8 VITAMIN B 12 DEFICIENCY: ICD-10-CM

## 2020-01-14 DIAGNOSIS — D64.9 ANEMIA, UNSPECIFIED TYPE: Primary | ICD-10-CM

## 2020-01-14 DIAGNOSIS — R10.11 RUQ PAIN: ICD-10-CM

## 2020-01-14 LAB
ALBUMIN SERPL BCP-MCNC: 4 G/DL (ref 3.5–5.7)
ALP SERPL-CCNC: 79 U/L (ref 40–150)
ALT SERPL W P-5'-P-CCNC: 14 U/L (ref 7–52)
ANION GAP SERPL CALCULATED.3IONS-SCNC: 7 MMOL/L (ref 4–13)
ANISOCYTOSIS BLD QL SMEAR: PRESENT
AST SERPL W P-5'-P-CCNC: 14 U/L (ref 13–39)
BILIRUB SERPL-MCNC: 0.6 MG/DL (ref 0.2–1)
BUN SERPL-MCNC: 15 MG/DL (ref 7–25)
CALCIUM SERPL-MCNC: 9.1 MG/DL (ref 8.6–10.5)
CHLORIDE SERPL-SCNC: 107 MMOL/L (ref 98–107)
CO2 SERPL-SCNC: 25 MMOL/L (ref 21–31)
CREAT SERPL-MCNC: 0.6 MG/DL (ref 0.6–1.2)
EOSINOPHIL # BLD AUTO: 0.05 THOUSAND/UL (ref 0–0.61)
EOSINOPHIL NFR BLD MANUAL: 2 % (ref 0–6)
ERYTHROCYTE [DISTWIDTH] IN BLOOD BY AUTOMATED COUNT: 14.7 % (ref 11.5–14.5)
FERRITIN SERPL-MCNC: 54 NG/ML (ref 8–388)
GFR SERPL CREATININE-BSD FRML MDRD: 130 ML/MIN/1.73SQ M
GLUCOSE P FAST SERPL-MCNC: 86 MG/DL (ref 65–99)
HCT VFR BLD AUTO: 37.4 % (ref 42–47)
HGB BLD-MCNC: 12.5 G/DL (ref 12–16)
INR PPP: 1.13 (ref 0.9–1.5)
LYMPHOCYTES # BLD AUTO: 0.83 THOUSAND/UL (ref 0.6–4.47)
LYMPHOCYTES # BLD AUTO: 32 % (ref 20–51)
MCH RBC QN AUTO: 25.7 PG (ref 26–34)
MCHC RBC AUTO-ENTMCNC: 33.4 G/DL (ref 31–37)
MCV RBC AUTO: 77 FL (ref 81–99)
MONOCYTES # BLD AUTO: 0.16 THOUSAND/UL (ref 0–1.22)
MONOCYTES NFR BLD AUTO: 6 % (ref 4–12)
NEUTS SEG # BLD: 1.56 THOUSAND/UL (ref 1.81–6.82)
NEUTS SEG NFR BLD AUTO: 60 % (ref 42–75)
OVALOCYTES BLD QL SMEAR: PRESENT
PLATELET # BLD AUTO: 94 THOUSANDS/UL (ref 149–390)
PLATELET BLD QL SMEAR: ABNORMAL
PMV BLD AUTO: 7.6 FL (ref 8.6–11.7)
POTASSIUM SERPL-SCNC: 4 MMOL/L (ref 3.5–5.5)
PROT SERPL-MCNC: 6.9 G/DL (ref 6.4–8.9)
PROTHROMBIN TIME: 13.1 SECONDS (ref 10.2–13)
RBC # BLD AUTO: 4.88 MILLION/UL (ref 3.9–5.2)
RBC MORPH BLD: ABNORMAL
SODIUM SERPL-SCNC: 139 MMOL/L (ref 134–143)
TIBC SERPL-MCNC: 442 UG/DL (ref 250–450)
TOTAL CELLS COUNTED SPEC: 100
VIT B12 SERPL-MCNC: 287 PG/ML (ref 100–900)
WBC # BLD AUTO: 2.6 THOUSAND/UL (ref 4.8–10.8)

## 2020-01-14 PROCEDURE — 80053 COMPREHEN METABOLIC PANEL: CPT

## 2020-01-14 PROCEDURE — 82607 VITAMIN B-12: CPT

## 2020-01-14 PROCEDURE — 85027 COMPLETE CBC AUTOMATED: CPT

## 2020-01-14 PROCEDURE — 76705 ECHO EXAM OF ABDOMEN: CPT

## 2020-01-14 PROCEDURE — 36415 COLL VENOUS BLD VENIPUNCTURE: CPT

## 2020-01-14 PROCEDURE — 83550 IRON BINDING TEST: CPT

## 2020-01-14 PROCEDURE — 85007 BL SMEAR W/DIFF WBC COUNT: CPT

## 2020-01-14 PROCEDURE — 85610 PROTHROMBIN TIME: CPT

## 2020-01-14 PROCEDURE — 82728 ASSAY OF FERRITIN: CPT

## 2020-03-02 ENCOUNTER — TRANSCRIBE ORDERS (OUTPATIENT)
Dept: URGENT CARE | Facility: CLINIC | Age: 25
End: 2020-03-02

## 2020-03-02 ENCOUNTER — LAB (OUTPATIENT)
Dept: LAB | Facility: CLINIC | Age: 25
End: 2020-03-02
Payer: COMMERCIAL

## 2020-03-02 DIAGNOSIS — D69.6 THROMBOCYTOPENIA, UNSPECIFIED (HCC): ICD-10-CM

## 2020-03-02 DIAGNOSIS — I81 PORTAL VEIN THROMBOSIS: ICD-10-CM

## 2020-03-02 DIAGNOSIS — K31.89 GASTROPTOSIS: ICD-10-CM

## 2020-03-02 DIAGNOSIS — D50.8 IRON DEFICIENCY ANEMIA SECONDARY TO INADEQUATE DIETARY IRON INTAKE: ICD-10-CM

## 2020-03-02 DIAGNOSIS — E53.8 BIOTIN-(PROPIONYL-COA-CARBOXYLASE) LIGASE DEFICIENCY: ICD-10-CM

## 2020-03-02 DIAGNOSIS — R16.1 SPLENOMEGALY: ICD-10-CM

## 2020-03-02 DIAGNOSIS — K92.0 HEMATEMESIS, PRESENCE OF NAUSEA NOT SPECIFIED: Primary | ICD-10-CM

## 2020-03-02 DIAGNOSIS — F31.81 BIPOLAR II DISORDER (HCC): ICD-10-CM

## 2020-03-02 DIAGNOSIS — K92.0 HEMATEMESIS, PRESENCE OF NAUSEA NOT SPECIFIED: ICD-10-CM

## 2020-03-02 DIAGNOSIS — I85.00 ESOPHAGEAL VARICES WITHOUT BLEEDING, UNSPECIFIED ESOPHAGEAL VARICES TYPE (HCC): ICD-10-CM

## 2020-03-02 LAB
FERRITIN SERPL-MCNC: 31 NG/ML (ref 8–388)
IRON SATN MFR SERPL: 11 %
IRON SERPL-MCNC: 53 UG/DL (ref 50–170)
TIBC SERPL-MCNC: 477 UG/DL (ref 250–450)

## 2020-03-02 PROCEDURE — 36415 COLL VENOUS BLD VENIPUNCTURE: CPT

## 2020-03-02 PROCEDURE — 82728 ASSAY OF FERRITIN: CPT

## 2020-03-02 PROCEDURE — 83550 IRON BINDING TEST: CPT

## 2020-03-02 PROCEDURE — 83540 ASSAY OF IRON: CPT

## 2020-11-06 ENCOUNTER — LAB (OUTPATIENT)
Dept: LAB | Facility: HOSPITAL | Age: 25
End: 2020-11-06
Payer: COMMERCIAL

## 2020-11-06 ENCOUNTER — TRANSCRIBE ORDERS (OUTPATIENT)
Dept: LAB | Facility: HOSPITAL | Age: 25
End: 2020-11-06

## 2020-11-06 DIAGNOSIS — K76.6 PORTAL HYPERTENSION (HCC): ICD-10-CM

## 2020-11-06 DIAGNOSIS — K76.6 PORTAL HYPERTENSION (HCC): Primary | ICD-10-CM

## 2020-11-06 LAB
ANISOCYTOSIS BLD QL SMEAR: PRESENT
ERYTHROCYTE [DISTWIDTH] IN BLOOD BY AUTOMATED COUNT: 15.6 % (ref 11.5–14.5)
FERRITIN SERPL-MCNC: 12 NG/ML (ref 8–388)
HCT VFR BLD AUTO: 36.2 % (ref 42–47)
HGB BLD-MCNC: 12.1 G/DL (ref 12–16)
IRON SERPL-MCNC: 72 UG/DL (ref 50–170)
MCH RBC QN AUTO: 24.9 PG (ref 26–34)
MCHC RBC AUTO-ENTMCNC: 33.4 G/DL (ref 31–37)
MCV RBC AUTO: 75 FL (ref 81–99)
MICROCYTES BLD QL AUTO: PRESENT
OVALOCYTES BLD QL SMEAR: PRESENT
PLATELET # BLD AUTO: 87 THOUSANDS/UL (ref 149–390)
PLATELET BLD QL SMEAR: ABNORMAL
PMV BLD AUTO: 7.7 FL (ref 8.6–11.7)
RBC # BLD AUTO: 4.86 MILLION/UL (ref 3.9–5.2)
RBC MORPH BLD: ABNORMAL
ROULEAUX BLD QL SMEAR: PRESENT
TIBC SERPL-MCNC: 486 UG/DL (ref 250–450)
WBC # BLD AUTO: 3.1 THOUSAND/UL (ref 4.8–10.8)

## 2020-11-06 PROCEDURE — 83550 IRON BINDING TEST: CPT

## 2020-11-06 PROCEDURE — 83540 ASSAY OF IRON: CPT

## 2020-11-06 PROCEDURE — 85027 COMPLETE CBC AUTOMATED: CPT

## 2020-11-06 PROCEDURE — 82728 ASSAY OF FERRITIN: CPT

## 2020-11-06 PROCEDURE — 36415 COLL VENOUS BLD VENIPUNCTURE: CPT

## 2020-11-16 ENCOUNTER — OFFICE VISIT (OUTPATIENT)
Dept: URGENT CARE | Facility: CLINIC | Age: 25
End: 2020-11-16
Payer: COMMERCIAL

## 2020-11-16 ENCOUNTER — APPOINTMENT (OUTPATIENT)
Dept: RADIOLOGY | Facility: CLINIC | Age: 25
End: 2020-11-16
Payer: COMMERCIAL

## 2020-11-16 VITALS
SYSTOLIC BLOOD PRESSURE: 121 MMHG | TEMPERATURE: 97.2 F | HEART RATE: 76 BPM | DIASTOLIC BLOOD PRESSURE: 90 MMHG | BODY MASS INDEX: 31.41 KG/M2 | WEIGHT: 160 LBS | HEIGHT: 60 IN | RESPIRATION RATE: 18 BRPM | OXYGEN SATURATION: 96 %

## 2020-11-16 DIAGNOSIS — S90.31XA CONTUSION OF RIGHT FOOT, INITIAL ENCOUNTER: Primary | ICD-10-CM

## 2020-11-16 DIAGNOSIS — S99.921A INJURY OF RIGHT FOOT, INITIAL ENCOUNTER: ICD-10-CM

## 2020-11-16 PROCEDURE — 73630 X-RAY EXAM OF FOOT: CPT

## 2020-11-16 PROCEDURE — 99213 OFFICE O/P EST LOW 20 MIN: CPT | Performed by: NURSE PRACTITIONER

## 2020-11-16 PROCEDURE — 73610 X-RAY EXAM OF ANKLE: CPT

## 2020-11-17 ENCOUNTER — TRANSCRIBE ORDERS (OUTPATIENT)
Dept: URGENT CARE | Facility: CLINIC | Age: 25
End: 2020-11-17

## 2020-11-17 ENCOUNTER — LAB (OUTPATIENT)
Dept: LAB | Facility: CLINIC | Age: 25
End: 2020-11-17
Payer: COMMERCIAL

## 2020-11-17 DIAGNOSIS — K76.6 PORTAL HYPERTENSION (HCC): Primary | ICD-10-CM

## 2020-11-17 DIAGNOSIS — K76.6 PORTAL HYPERTENSION (HCC): ICD-10-CM

## 2020-11-17 LAB
ERYTHROCYTE [DISTWIDTH] IN BLOOD BY AUTOMATED COUNT: 15.5 % (ref 11.6–15.1)
HCT VFR BLD AUTO: 37.4 % (ref 34.8–46.1)
HGB BLD-MCNC: 11.8 G/DL (ref 11.5–15.4)
MCH RBC QN AUTO: 24.6 PG (ref 26.8–34.3)
MCHC RBC AUTO-ENTMCNC: 31.6 G/DL (ref 31.4–37.4)
MCV RBC AUTO: 78 FL (ref 82–98)
PLATELET # BLD AUTO: 121 THOUSANDS/UL (ref 149–390)
PMV BLD AUTO: 10.2 FL (ref 8.9–12.7)
RBC # BLD AUTO: 4.79 MILLION/UL (ref 3.81–5.12)
WBC # BLD AUTO: 3.99 THOUSAND/UL (ref 4.31–10.16)

## 2020-11-17 PROCEDURE — 36415 COLL VENOUS BLD VENIPUNCTURE: CPT

## 2020-11-17 PROCEDURE — 85027 COMPLETE CBC AUTOMATED: CPT

## 2020-12-11 DIAGNOSIS — R43.0 LOSS OF SMELL: ICD-10-CM

## 2020-12-11 DIAGNOSIS — R53.83 FATIGUE, UNSPECIFIED TYPE: ICD-10-CM

## 2020-12-11 DIAGNOSIS — R51.9 NONINTRACTABLE HEADACHE, UNSPECIFIED CHRONICITY PATTERN, UNSPECIFIED HEADACHE TYPE: ICD-10-CM

## 2020-12-11 DIAGNOSIS — R43.2 LOSS OF TASTE: ICD-10-CM

## 2020-12-11 DIAGNOSIS — R05.9 COUGH: ICD-10-CM

## 2020-12-11 PROCEDURE — U0003 INFECTIOUS AGENT DETECTION BY NUCLEIC ACID (DNA OR RNA); SEVERE ACUTE RESPIRATORY SYNDROME CORONAVIRUS 2 (SARS-COV-2) (CORONAVIRUS DISEASE [COVID-19]), AMPLIFIED PROBE TECHNIQUE, MAKING USE OF HIGH THROUGHPUT TECHNOLOGIES AS DESCRIBED BY CMS-2020-01-R: HCPCS | Performed by: INTERNAL MEDICINE

## 2020-12-12 LAB — SARS-COV-2 RNA SPEC QL NAA+PROBE: NOT DETECTED

## 2021-08-25 ENCOUNTER — APPOINTMENT (OUTPATIENT)
Dept: LAB | Facility: CLINIC | Age: 26
End: 2021-08-25
Payer: COMMERCIAL

## 2021-08-25 DIAGNOSIS — D50.9 IRON DEFICIENCY ANEMIA, UNSPECIFIED IRON DEFICIENCY ANEMIA TYPE: ICD-10-CM

## 2021-08-25 LAB
ALBUMIN SERPL BCP-MCNC: 3.6 G/DL (ref 3.5–5)
ALP SERPL-CCNC: 73 U/L (ref 46–116)
ALT SERPL W P-5'-P-CCNC: 27 U/L (ref 12–78)
ANION GAP SERPL CALCULATED.3IONS-SCNC: 4 MMOL/L (ref 4–13)
AST SERPL W P-5'-P-CCNC: 27 U/L (ref 5–45)
BILIRUB SERPL-MCNC: 0.94 MG/DL (ref 0.2–1)
BUN SERPL-MCNC: 18 MG/DL (ref 5–25)
CALCIUM SERPL-MCNC: 8.7 MG/DL (ref 8.3–10.1)
CHLORIDE SERPL-SCNC: 113 MMOL/L (ref 100–108)
CO2 SERPL-SCNC: 24 MMOL/L (ref 21–32)
CREAT SERPL-MCNC: 0.6 MG/DL (ref 0.6–1.3)
FERRITIN SERPL-MCNC: 8 NG/ML (ref 8–388)
GFR SERPL CREATININE-BSD FRML MDRD: 128 ML/MIN/1.73SQ M
GLUCOSE P FAST SERPL-MCNC: 96 MG/DL (ref 65–99)
IRON SATN MFR SERPL: 9 %
IRON SERPL-MCNC: 39 UG/DL (ref 50–170)
POTASSIUM SERPL-SCNC: 3.7 MMOL/L (ref 3.5–5.3)
PROT SERPL-MCNC: 6.8 G/DL (ref 6.4–8.2)
SODIUM SERPL-SCNC: 141 MMOL/L (ref 136–145)
TIBC SERPL-MCNC: 438 UG/DL (ref 250–450)
VIT B12 SERPL-MCNC: 422 PG/ML (ref 100–900)

## 2021-08-25 PROCEDURE — 80053 COMPREHEN METABOLIC PANEL: CPT

## 2021-08-25 PROCEDURE — 83550 IRON BINDING TEST: CPT

## 2021-08-25 PROCEDURE — 82607 VITAMIN B-12: CPT

## 2021-08-25 PROCEDURE — 83540 ASSAY OF IRON: CPT

## 2021-08-25 PROCEDURE — 82728 ASSAY OF FERRITIN: CPT

## 2021-10-05 ENCOUNTER — APPOINTMENT (OUTPATIENT)
Dept: RADIOLOGY | Facility: CLINIC | Age: 26
End: 2021-10-05
Payer: COMMERCIAL

## 2021-10-05 ENCOUNTER — OFFICE VISIT (OUTPATIENT)
Dept: URGENT CARE | Facility: CLINIC | Age: 26
End: 2021-10-05
Payer: COMMERCIAL

## 2021-10-05 VITALS
HEART RATE: 66 BPM | SYSTOLIC BLOOD PRESSURE: 110 MMHG | BODY MASS INDEX: 29.84 KG/M2 | OXYGEN SATURATION: 100 % | WEIGHT: 152 LBS | RESPIRATION RATE: 18 BRPM | DIASTOLIC BLOOD PRESSURE: 63 MMHG | HEIGHT: 60 IN

## 2021-10-05 DIAGNOSIS — S99.912A INJURY OF LEFT ANKLE, INITIAL ENCOUNTER: ICD-10-CM

## 2021-10-05 DIAGNOSIS — S99.912A INJURY OF LEFT ANKLE, INITIAL ENCOUNTER: Primary | ICD-10-CM

## 2021-10-05 PROCEDURE — 73630 X-RAY EXAM OF FOOT: CPT

## 2021-10-05 PROCEDURE — 73610 X-RAY EXAM OF ANKLE: CPT

## 2021-10-05 PROCEDURE — 99213 OFFICE O/P EST LOW 20 MIN: CPT | Performed by: NURSE PRACTITIONER

## 2021-10-05 RX ORDER — ACETAMINOPHEN 325 MG/1
650 TABLET ORAL ONCE
Status: COMPLETED | OUTPATIENT
Start: 2021-10-05 | End: 2021-10-05

## 2021-10-05 RX ADMIN — ACETAMINOPHEN 650 MG: 325 TABLET ORAL at 14:49

## 2022-02-07 ENCOUNTER — APPOINTMENT (OUTPATIENT)
Dept: LAB | Facility: CLINIC | Age: 27
End: 2022-02-07
Payer: COMMERCIAL

## 2022-02-07 DIAGNOSIS — I85.00 ESOPHAGEAL VARICES DETERMINED BY ENDOSCOPY (HCC): ICD-10-CM

## 2022-02-07 DIAGNOSIS — D50.8 OTHER IRON DEFICIENCY ANEMIA: ICD-10-CM

## 2022-02-07 DIAGNOSIS — E53.8 VITAMIN B12 DEFICIENCY: ICD-10-CM

## 2022-02-07 DIAGNOSIS — D70.8 OTHER NEUTROPENIA (HCC): ICD-10-CM

## 2022-02-07 DIAGNOSIS — I81 PORTAL VEIN THROMBOSIS: ICD-10-CM

## 2022-02-07 DIAGNOSIS — D69.1 THROMBOCYTASTHENIA (HCC): ICD-10-CM

## 2022-02-07 DIAGNOSIS — R16.1 SPLENOMEGALY: ICD-10-CM

## 2022-02-07 LAB
ALBUMIN SERPL BCP-MCNC: 3.6 G/DL (ref 3.5–5)
ALP SERPL-CCNC: 71 U/L (ref 46–116)
ALT SERPL W P-5'-P-CCNC: 52 U/L (ref 12–78)
ANION GAP SERPL CALCULATED.3IONS-SCNC: 4 MMOL/L (ref 4–13)
AST SERPL W P-5'-P-CCNC: 32 U/L (ref 5–45)
BASOPHILS # BLD AUTO: 0.01 THOUSANDS/ΜL (ref 0–0.1)
BASOPHILS NFR BLD AUTO: 0 % (ref 0–1)
BILIRUB SERPL-MCNC: 2.23 MG/DL (ref 0.2–1)
BUN SERPL-MCNC: 14 MG/DL (ref 5–25)
CALCIUM SERPL-MCNC: 8.7 MG/DL (ref 8.3–10.1)
CHLORIDE SERPL-SCNC: 111 MMOL/L (ref 100–108)
CO2 SERPL-SCNC: 27 MMOL/L (ref 21–32)
CREAT SERPL-MCNC: 0.71 MG/DL (ref 0.6–1.3)
EOSINOPHIL # BLD AUTO: 0.09 THOUSAND/ΜL (ref 0–0.61)
EOSINOPHIL NFR BLD AUTO: 3 % (ref 0–6)
ERYTHROCYTE [DISTWIDTH] IN BLOOD BY AUTOMATED COUNT: 16.8 % (ref 11.6–15.1)
FERRITIN SERPL-MCNC: 37 NG/ML (ref 8–388)
GFR SERPL CREATININE-BSD FRML MDRD: 119 ML/MIN/1.73SQ M
GLUCOSE P FAST SERPL-MCNC: 115 MG/DL (ref 65–99)
HCT VFR BLD AUTO: 36.2 % (ref 34.8–46.1)
HGB BLD-MCNC: 11.8 G/DL (ref 11.5–15.4)
IMM GRANULOCYTES # BLD AUTO: 0.01 THOUSAND/UL (ref 0–0.2)
IMM GRANULOCYTES NFR BLD AUTO: 0 % (ref 0–2)
LYMPHOCYTES # BLD AUTO: 1.13 THOUSANDS/ΜL (ref 0.6–4.47)
LYMPHOCYTES NFR BLD AUTO: 40 % (ref 14–44)
MCH RBC QN AUTO: 25.7 PG (ref 26.8–34.3)
MCHC RBC AUTO-ENTMCNC: 32.6 G/DL (ref 31.4–37.4)
MCV RBC AUTO: 79 FL (ref 82–98)
MONOCYTES # BLD AUTO: 0.19 THOUSAND/ΜL (ref 0.17–1.22)
MONOCYTES NFR BLD AUTO: 7 % (ref 4–12)
NEUTROPHILS # BLD AUTO: 1.42 THOUSANDS/ΜL (ref 1.85–7.62)
NEUTS SEG NFR BLD AUTO: 50 % (ref 43–75)
NRBC BLD AUTO-RTO: 0 /100 WBCS
PLATELET # BLD AUTO: 73 THOUSANDS/UL (ref 149–390)
PMV BLD AUTO: 10.7 FL (ref 8.9–12.7)
POTASSIUM SERPL-SCNC: 3.9 MMOL/L (ref 3.5–5.3)
PROT SERPL-MCNC: 6.6 G/DL (ref 6.4–8.2)
RBC # BLD AUTO: 4.6 MILLION/UL (ref 3.81–5.12)
SODIUM SERPL-SCNC: 142 MMOL/L (ref 136–145)
TIBC SERPL-MCNC: 310 UG/DL (ref 250–450)
WBC # BLD AUTO: 2.85 THOUSAND/UL (ref 4.31–10.16)

## 2022-02-07 PROCEDURE — 36415 COLL VENOUS BLD VENIPUNCTURE: CPT

## 2022-02-07 PROCEDURE — 82728 ASSAY OF FERRITIN: CPT

## 2022-02-07 PROCEDURE — 85025 COMPLETE CBC W/AUTO DIFF WBC: CPT

## 2022-02-07 PROCEDURE — 80053 COMPREHEN METABOLIC PANEL: CPT

## 2022-02-07 PROCEDURE — 83550 IRON BINDING TEST: CPT

## 2022-02-07 PROCEDURE — 82607 VITAMIN B-12: CPT

## 2022-02-08 LAB — VIT B12 SERPL-MCNC: 269 PG/ML (ref 100–900)

## 2022-06-02 ENCOUNTER — APPOINTMENT (OUTPATIENT)
Dept: LAB | Facility: CLINIC | Age: 27
End: 2022-06-02
Payer: COMMERCIAL

## 2022-06-02 DIAGNOSIS — E53.8 VITAMIN B12 DEFICIENCY: ICD-10-CM

## 2022-06-02 DIAGNOSIS — I81 PORTAL VEIN THROMBOSIS: ICD-10-CM

## 2022-06-02 DIAGNOSIS — D50.0 IRON DEFICIENCY ANEMIA DUE TO CHRONIC BLOOD LOSS: ICD-10-CM

## 2022-06-02 LAB
ALBUMIN SERPL BCP-MCNC: 4 G/DL (ref 3.5–5)
ALP SERPL-CCNC: 43 U/L (ref 46–116)
ALT SERPL W P-5'-P-CCNC: 35 U/L (ref 12–78)
ANION GAP SERPL CALCULATED.3IONS-SCNC: 5 MMOL/L (ref 4–13)
AST SERPL W P-5'-P-CCNC: 25 U/L (ref 5–45)
BASOPHILS # BLD AUTO: 0.01 THOUSANDS/ΜL (ref 0–0.1)
BASOPHILS NFR BLD AUTO: 0 % (ref 0–1)
BILIRUB SERPL-MCNC: 1.97 MG/DL (ref 0.2–1)
BUN SERPL-MCNC: 11 MG/DL (ref 5–25)
CALCIUM SERPL-MCNC: 9.1 MG/DL (ref 8.3–10.1)
CHLORIDE SERPL-SCNC: 111 MMOL/L (ref 100–108)
CO2 SERPL-SCNC: 24 MMOL/L (ref 21–32)
CREAT SERPL-MCNC: 0.74 MG/DL (ref 0.6–1.3)
EOSINOPHIL # BLD AUTO: 0.06 THOUSAND/ΜL (ref 0–0.61)
EOSINOPHIL NFR BLD AUTO: 2 % (ref 0–6)
ERYTHROCYTE [DISTWIDTH] IN BLOOD BY AUTOMATED COUNT: 15.1 % (ref 11.6–15.1)
FERRITIN SERPL-MCNC: 166 NG/ML (ref 8–388)
GFR SERPL CREATININE-BSD FRML MDRD: 112 ML/MIN/1.73SQ M
GLUCOSE P FAST SERPL-MCNC: 106 MG/DL (ref 65–99)
HCT VFR BLD AUTO: 36.7 % (ref 34.8–46.1)
HGB BLD-MCNC: 12.4 G/DL (ref 11.5–15.4)
IMM GRANULOCYTES # BLD AUTO: 0.01 THOUSAND/UL (ref 0–0.2)
IMM GRANULOCYTES NFR BLD AUTO: 0 % (ref 0–2)
IRON SERPL-MCNC: 54 UG/DL (ref 50–170)
LYMPHOCYTES # BLD AUTO: 0.49 THOUSANDS/ΜL (ref 0.6–4.47)
LYMPHOCYTES NFR BLD AUTO: 15 % (ref 14–44)
MCH RBC QN AUTO: 27.4 PG (ref 26.8–34.3)
MCHC RBC AUTO-ENTMCNC: 33.8 G/DL (ref 31.4–37.4)
MCV RBC AUTO: 81 FL (ref 82–98)
MONOCYTES # BLD AUTO: 0.25 THOUSAND/ΜL (ref 0.17–1.22)
MONOCYTES NFR BLD AUTO: 8 % (ref 4–12)
NEUTROPHILS # BLD AUTO: 2.5 THOUSANDS/ΜL (ref 1.85–7.62)
NEUTS SEG NFR BLD AUTO: 75 % (ref 43–75)
NRBC BLD AUTO-RTO: 0 /100 WBCS
PLATELET # BLD AUTO: 52 THOUSANDS/UL (ref 149–390)
PMV BLD AUTO: 10.3 FL (ref 8.9–12.7)
POTASSIUM SERPL-SCNC: 3.7 MMOL/L (ref 3.5–5.3)
PROT SERPL-MCNC: 6.9 G/DL (ref 6.4–8.2)
RBC # BLD AUTO: 4.52 MILLION/UL (ref 3.81–5.12)
SODIUM SERPL-SCNC: 140 MMOL/L (ref 136–145)
TIBC SERPL-MCNC: 319 UG/DL (ref 250–450)
VIT B12 SERPL-MCNC: 326 PG/ML (ref 100–900)
WBC # BLD AUTO: 3.32 THOUSAND/UL (ref 4.31–10.16)

## 2022-06-02 PROCEDURE — 83540 ASSAY OF IRON: CPT

## 2022-06-02 PROCEDURE — 80053 COMPREHEN METABOLIC PANEL: CPT

## 2022-06-02 PROCEDURE — 83550 IRON BINDING TEST: CPT

## 2022-06-02 PROCEDURE — 36415 COLL VENOUS BLD VENIPUNCTURE: CPT

## 2022-06-02 PROCEDURE — 82607 VITAMIN B-12: CPT

## 2022-06-02 PROCEDURE — 85025 COMPLETE CBC W/AUTO DIFF WBC: CPT

## 2022-06-02 PROCEDURE — 82728 ASSAY OF FERRITIN: CPT

## 2022-10-10 ENCOUNTER — APPOINTMENT (OUTPATIENT)
Dept: LAB | Facility: CLINIC | Age: 27
End: 2022-10-10
Payer: COMMERCIAL

## 2022-10-10 DIAGNOSIS — D50.0 IRON DEFICIENCY ANEMIA DUE TO CHRONIC BLOOD LOSS: ICD-10-CM

## 2022-10-10 DIAGNOSIS — E53.8 VITAMIN B12 DEFICIENCY: ICD-10-CM

## 2022-10-10 DIAGNOSIS — K76.6 PORTAL HYPERTENSION (HCC): ICD-10-CM

## 2022-10-10 LAB
ALBUMIN SERPL BCP-MCNC: 3.6 G/DL (ref 3.5–5)
ALP SERPL-CCNC: 71 U/L (ref 46–116)
ALT SERPL W P-5'-P-CCNC: 51 U/L (ref 12–78)
ANION GAP SERPL CALCULATED.3IONS-SCNC: 6 MMOL/L (ref 4–13)
AST SERPL W P-5'-P-CCNC: 31 U/L (ref 5–45)
BILIRUB SERPL-MCNC: 1.03 MG/DL (ref 0.2–1)
BUN SERPL-MCNC: 10 MG/DL (ref 5–25)
CALCIUM SERPL-MCNC: 8.5 MG/DL (ref 8.3–10.1)
CHLORIDE SERPL-SCNC: 113 MMOL/L (ref 96–108)
CO2 SERPL-SCNC: 23 MMOL/L (ref 21–32)
CREAT SERPL-MCNC: 0.64 MG/DL (ref 0.6–1.3)
FERRITIN SERPL-MCNC: 179 NG/ML (ref 8–388)
GFR SERPL CREATININE-BSD FRML MDRD: 124 ML/MIN/1.73SQ M
GLUCOSE P FAST SERPL-MCNC: 90 MG/DL (ref 65–99)
INR PPP: 1.39 (ref 0.84–1.19)
IRON SERPL-MCNC: 32 UG/DL (ref 50–170)
POTASSIUM SERPL-SCNC: 3.3 MMOL/L (ref 3.5–5.3)
PROT SERPL-MCNC: 6.3 G/DL (ref 6.4–8.4)
PROTHROMBIN TIME: 17.3 SECONDS (ref 11.6–14.5)
SODIUM SERPL-SCNC: 142 MMOL/L (ref 135–147)
TIBC SERPL-MCNC: 247 UG/DL (ref 250–450)
VIT B12 SERPL-MCNC: 371 PG/ML (ref 100–900)

## 2022-10-10 PROCEDURE — 36415 COLL VENOUS BLD VENIPUNCTURE: CPT

## 2022-10-10 PROCEDURE — 80053 COMPREHEN METABOLIC PANEL: CPT

## 2022-10-10 PROCEDURE — 82607 VITAMIN B-12: CPT

## 2022-10-10 PROCEDURE — 83550 IRON BINDING TEST: CPT

## 2022-10-10 PROCEDURE — 82728 ASSAY OF FERRITIN: CPT

## 2022-10-10 PROCEDURE — 83540 ASSAY OF IRON: CPT

## 2022-10-10 PROCEDURE — 85007 BL SMEAR W/DIFF WBC COUNT: CPT

## 2022-10-10 PROCEDURE — 85027 COMPLETE CBC AUTOMATED: CPT

## 2022-10-10 PROCEDURE — 85610 PROTHROMBIN TIME: CPT

## 2022-10-11 LAB
ANISOCYTOSIS BLD QL SMEAR: PRESENT
BASOPHILS # BLD MANUAL: 0 THOUSAND/UL (ref 0–0.1)
BASOPHILS NFR MAR MANUAL: 0 % (ref 0–1)
EOSINOPHIL # BLD MANUAL: 0.15 THOUSAND/UL (ref 0–0.4)
EOSINOPHIL NFR BLD MANUAL: 8 % (ref 0–6)
ERYTHROCYTE [DISTWIDTH] IN BLOOD BY AUTOMATED COUNT: 15.3 % (ref 11.6–15.1)
HCT VFR BLD AUTO: 33.2 % (ref 34.8–46.1)
HGB BLD-MCNC: 10.6 G/DL (ref 11.5–15.4)
LYMPHOCYTES # BLD AUTO: 0.33 THOUSAND/UL (ref 0.6–4.47)
LYMPHOCYTES # BLD AUTO: 18 % (ref 14–44)
MCH RBC QN AUTO: 26.5 PG (ref 26.8–34.3)
MCHC RBC AUTO-ENTMCNC: 31.9 G/DL (ref 31.4–37.4)
MCV RBC AUTO: 83 FL (ref 82–98)
MICROCYTES BLD QL AUTO: PRESENT
MONOCYTES # BLD AUTO: 0.19 THOUSAND/UL (ref 0–1.22)
MONOCYTES NFR BLD: 10 % (ref 4–12)
NEUTROPHILS # BLD MANUAL: 1.09 THOUSAND/UL (ref 1.85–7.62)
NEUTS BAND NFR BLD MANUAL: 5 % (ref 0–8)
NEUTS SEG NFR BLD AUTO: 54 % (ref 43–75)
PLATELET # BLD AUTO: 70 THOUSANDS/UL (ref 149–390)
PLATELET BLD QL SMEAR: ABNORMAL
PMV BLD AUTO: 10.6 FL (ref 8.9–12.7)
RBC # BLD AUTO: 4 MILLION/UL (ref 3.81–5.12)
RBC MORPH BLD: NORMAL
VARIANT LYMPHS # BLD AUTO: 5 %
WBC # BLD AUTO: 1.85 THOUSAND/UL (ref 4.31–10.16)

## 2022-11-15 ENCOUNTER — APPOINTMENT (OUTPATIENT)
Dept: LAB | Facility: CLINIC | Age: 27
End: 2022-11-15

## 2022-11-15 DIAGNOSIS — D50.0 IRON DEFICIENCY ANEMIA SECONDARY TO BLOOD LOSS (CHRONIC): ICD-10-CM

## 2022-11-15 LAB
ALBUMIN SERPL BCP-MCNC: 3.5 G/DL (ref 3.5–5)
ALP SERPL-CCNC: 67 U/L (ref 46–116)
ALT SERPL W P-5'-P-CCNC: 44 U/L (ref 12–78)
ANION GAP SERPL CALCULATED.3IONS-SCNC: 6 MMOL/L (ref 4–13)
AST SERPL W P-5'-P-CCNC: 24 U/L (ref 5–45)
BASOPHILS # BLD AUTO: 0.01 THOUSANDS/ÂΜL (ref 0–0.1)
BASOPHILS NFR BLD AUTO: 0 % (ref 0–1)
BILIRUB SERPL-MCNC: 0.92 MG/DL (ref 0.2–1)
BUN SERPL-MCNC: 11 MG/DL (ref 5–25)
CALCIUM SERPL-MCNC: 8.6 MG/DL (ref 8.3–10.1)
CHLORIDE SERPL-SCNC: 111 MMOL/L (ref 96–108)
CO2 SERPL-SCNC: 24 MMOL/L (ref 21–32)
CREAT SERPL-MCNC: 0.58 MG/DL (ref 0.6–1.3)
EOSINOPHIL # BLD AUTO: 0.21 THOUSAND/ÂΜL (ref 0–0.61)
EOSINOPHIL NFR BLD AUTO: 8 % (ref 0–6)
ERYTHROCYTE [DISTWIDTH] IN BLOOD BY AUTOMATED COUNT: 15.7 % (ref 11.6–15.1)
FERRITIN SERPL-MCNC: 152 NG/ML (ref 8–388)
GFR SERPL CREATININE-BSD FRML MDRD: 128 ML/MIN/1.73SQ M
GLUCOSE P FAST SERPL-MCNC: 101 MG/DL (ref 65–99)
HCT VFR BLD AUTO: 34.9 % (ref 34.8–46.1)
HGB BLD-MCNC: 11.3 G/DL (ref 11.5–15.4)
IMM GRANULOCYTES # BLD AUTO: 0 THOUSAND/UL (ref 0–0.2)
IMM GRANULOCYTES NFR BLD AUTO: 0 % (ref 0–2)
IRON SERPL-MCNC: 37 UG/DL (ref 50–170)
LYMPHOCYTES # BLD AUTO: 0.66 THOUSANDS/ÂΜL (ref 0.6–4.47)
LYMPHOCYTES NFR BLD AUTO: 26 % (ref 14–44)
MCH RBC QN AUTO: 26.5 PG (ref 26.8–34.3)
MCHC RBC AUTO-ENTMCNC: 32.4 G/DL (ref 31.4–37.4)
MCV RBC AUTO: 82 FL (ref 82–98)
MONOCYTES # BLD AUTO: 0.2 THOUSAND/ÂΜL (ref 0.17–1.22)
MONOCYTES NFR BLD AUTO: 8 % (ref 4–12)
NEUTROPHILS # BLD AUTO: 1.44 THOUSANDS/ÂΜL (ref 1.85–7.62)
NEUTS SEG NFR BLD AUTO: 58 % (ref 43–75)
NRBC BLD AUTO-RTO: 0 /100 WBCS
PLATELET # BLD AUTO: 65 THOUSANDS/UL (ref 149–390)
PMV BLD AUTO: 11 FL (ref 8.9–12.7)
POTASSIUM SERPL-SCNC: 3.7 MMOL/L (ref 3.5–5.3)
PROT SERPL-MCNC: 6.6 G/DL (ref 6.4–8.4)
RBC # BLD AUTO: 4.26 MILLION/UL (ref 3.81–5.12)
SODIUM SERPL-SCNC: 141 MMOL/L (ref 135–147)
TIBC SERPL-MCNC: 266 UG/DL (ref 250–450)
VIT B12 SERPL-MCNC: 289 PG/ML (ref 100–900)
WBC # BLD AUTO: 2.52 THOUSAND/UL (ref 4.31–10.16)

## 2023-01-31 ENCOUNTER — APPOINTMENT (OUTPATIENT)
Dept: LAB | Facility: CLINIC | Age: 28
End: 2023-01-31

## 2023-01-31 DIAGNOSIS — R10.13 EPIGASTRIC PAIN: ICD-10-CM

## 2023-01-31 LAB
ALBUMIN SERPL BCP-MCNC: 4 G/DL (ref 3.5–5)
ALP SERPL-CCNC: 47 U/L (ref 46–116)
ALT SERPL W P-5'-P-CCNC: 34 U/L (ref 12–78)
ANION GAP SERPL CALCULATED.3IONS-SCNC: 3 MMOL/L (ref 4–13)
AST SERPL W P-5'-P-CCNC: 22 U/L (ref 5–45)
BILIRUB SERPL-MCNC: 1.93 MG/DL (ref 0.2–1)
BUN SERPL-MCNC: 14 MG/DL (ref 5–25)
CALCIUM SERPL-MCNC: 8.8 MG/DL (ref 8.3–10.1)
CHLORIDE SERPL-SCNC: 113 MMOL/L (ref 96–108)
CO2 SERPL-SCNC: 25 MMOL/L (ref 21–32)
CREAT SERPL-MCNC: 0.75 MG/DL (ref 0.6–1.3)
ERYTHROCYTE [DISTWIDTH] IN BLOOD BY AUTOMATED COUNT: 15.7 % (ref 11.6–15.1)
GFR SERPL CREATININE-BSD FRML MDRD: 111 ML/MIN/1.73SQ M
GLUCOSE P FAST SERPL-MCNC: 88 MG/DL (ref 65–99)
HCT VFR BLD AUTO: 38.4 % (ref 34.8–46.1)
HGB BLD-MCNC: 12.7 G/DL (ref 11.5–15.4)
LIPASE SERPL-CCNC: 204 U/L (ref 73–393)
MCH RBC QN AUTO: 26.8 PG (ref 26.8–34.3)
MCHC RBC AUTO-ENTMCNC: 33.1 G/DL (ref 31.4–37.4)
MCV RBC AUTO: 81 FL (ref 82–98)
PLATELET # BLD AUTO: 73 THOUSANDS/UL (ref 149–390)
PMV BLD AUTO: 10.1 FL (ref 8.9–12.7)
POTASSIUM SERPL-SCNC: 3.5 MMOL/L (ref 3.5–5.3)
PROT SERPL-MCNC: 6.6 G/DL (ref 6.4–8.4)
RBC # BLD AUTO: 4.74 MILLION/UL (ref 3.81–5.12)
SODIUM SERPL-SCNC: 141 MMOL/L (ref 135–147)
WBC # BLD AUTO: 4.26 THOUSAND/UL (ref 4.31–10.16)

## 2023-04-02 ENCOUNTER — HOSPITAL ENCOUNTER (EMERGENCY)
Facility: HOSPITAL | Age: 28
Discharge: HOME | End: 2023-04-02
Attending: EMERGENCY MEDICINE

## 2023-04-02 ENCOUNTER — APPOINTMENT (EMERGENCY)
Dept: RADIOLOGY | Facility: HOSPITAL | Age: 28
End: 2023-04-02

## 2023-04-02 VITALS
HEART RATE: 69 BPM | BODY MASS INDEX: 23.56 KG/M2 | TEMPERATURE: 98.6 F | WEIGHT: 120 LBS | RESPIRATION RATE: 19 BRPM | SYSTOLIC BLOOD PRESSURE: 167 MMHG | DIASTOLIC BLOOD PRESSURE: 104 MMHG | OXYGEN SATURATION: 97 % | HEIGHT: 60 IN
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
## 2023-04-02 DIAGNOSIS — S51.851A DOG BITE OF RIGHT FOREARM, INITIAL ENCOUNTER: Primary | ICD-10-CM

## 2023-04-02 RX ORDER — CLINDAMYCIN HYDROCHLORIDE 300 MG/1
300 CAPSULE ORAL 3 TIMES DAILY
Qty: 15 CAPSULE | Refills: 0 | Status: SHIPPED | OUTPATIENT
Start: 2023-04-02 | End: 2023-04-07

## 2023-04-02 RX ORDER — LORAZEPAM 0.5 MG/1
0.5 TABLET ORAL ONCE
Status: COMPLETED | OUTPATIENT
Start: 2023-04-02 | End: 2023-04-02

## 2023-04-02 RX ORDER — HYDROCODONE BITARTRATE AND ACETAMINOPHEN 5; 325 MG/1; MG/1
1 TABLET ORAL ONCE
Status: COMPLETED | OUTPATIENT
Start: 2023-04-02 | End: 2023-04-02

## 2023-04-02 RX ORDER — CLINDAMYCIN HYDROCHLORIDE 150 MG/1
300 CAPSULE ORAL ONCE
Status: COMPLETED | OUTPATIENT
Start: 2023-04-02 | End: 2023-04-02

## 2023-04-02 RX ADMIN — TETANUS TOXOID, REDUCED DIPHTHERIA TOXOID AND ACELLULAR PERTUSSIS VACCINE, ADSORBED 0.5 ML: 5; 2.5; 8; 8; 2.5 SUSPENSION INTRAMUSCULAR at 19:06

## 2023-04-02 RX ADMIN — LORAZEPAM 0.5 MG: 0.5 TABLET ORAL at 17:44

## 2023-04-02 RX ADMIN — CLINDAMYCIN HYDROCHLORIDE 300 MG: 150 CAPSULE ORAL at 17:44

## 2023-04-02 RX ADMIN — HYDROCODONE BITARTRATE AND ACETAMINOPHEN 1 TABLET: 5; 325 TABLET ORAL at 17:44

## 2023-04-02 NOTE — DISCHARGE INSTRUCTIONS
Take Tylenol Motrin as needed for pain  Keep arm elevated in a sling to reduce throbbing and pain  Ice to the area 4-6 times a day for 10 to 15 minutes at a time  Take all antibiotics as prescribed  Consider a probiotic or yogurt daily while on antibiotics  Stitch needs to be removed in a week  Wash wound daily with soap and water, place antibiotic ointment to the wound and a new dressing  Return to the ER with signs of significant infection such as pus fever chills or streaking up the arm

## 2023-04-02 NOTE — ED PROVIDER NOTES
History  No chief complaint on file  57-year-old female presents emergency department complaining of a dog bite to her right arm  Patient notes that she recently moved home to live with her parents and has a small dog  Parents have an [place] and their dogs got into a fight in the backyard earlier today  While trying to break up the fight, the patient got bit on her right arm  This occurred roughly 1 hour prior to arrival   Patient is right-hand dominant  Patient complains of pain and appears to be very anxious  The patient's tetanus shot is not up-to-date  The dog's shots are likely up-to-date however we are waiting for the mother to present to give more information  Prior to Admission Medications   Prescriptions Last Dose Informant Patient Reported? Taking? LORazepam (ATIVAN) 1 mg tablet   Yes No   Si mg 2 (two) times a day as needed   NUVARING 0 12-0 015 MG/24HR vaginal ring   Yes No   Patient not taking: Reported on 10/5/2021   OMEPRAZOLE PO   Yes No   Si mg   Vortioxetine HBr (TRINTELLIX) 5 MG tablet   Yes No   Sig: Take 5 mg by mouth daily   nadolol (CORGARD) 40 mg tablet   Yes No      Facility-Administered Medications: None       Past Medical History:   Diagnosis Date   • Anxiety disorder    • Portal vein thrombosis        Past Surgical History:   Procedure Laterality Date   • GASTRIC BYPASS         Family History   Problem Relation Age of Onset   • Hypertension Mother    • Diabetes Mother    • Crohn's disease Mother    • No Known Problems Father      I have reviewed and agree with the history as documented  E-Cigarette/Vaping     E-Cigarette/Vaping Substances     Social History     Tobacco Use   • Smoking status: Never   • Smokeless tobacco: Never   Substance Use Topics   • Alcohol use: Never   • Drug use: Never       Review of Systems   Constitutional: Negative for chills and fever  HENT: Negative for dental problem, ear pain and sore throat      Eyes: Negative for pain and visual disturbance  Respiratory: Negative for cough and shortness of breath  Cardiovascular: Negative for chest pain and palpitations  Gastrointestinal: Negative for abdominal pain and vomiting  Genitourinary: Negative for dysuria and hematuria  Musculoskeletal: Positive for arthralgias and myalgias  Negative for back pain  Skin: Positive for wound  Negative for color change and rash  Neurological: Negative for seizures and syncope  Psychiatric/Behavioral: The patient is hyperactive  All other systems reviewed and are negative  Physical Exam  Physical Exam  Vitals and nursing note reviewed  Constitutional:       General: She is not in acute distress  Appearance: Normal appearance  She is well-developed  HENT:      Head: Normocephalic and atraumatic  Right Ear: External ear normal       Left Ear: External ear normal       Nose: Nose normal       Mouth/Throat:      Mouth: Mucous membranes are moist    Eyes:      Conjunctiva/sclera: Conjunctivae normal    Cardiovascular:      Rate and Rhythm: Normal rate and regular rhythm  Pulses: Normal pulses  Heart sounds: Normal heart sounds  No murmur heard  Pulmonary:      Effort: Pulmonary effort is normal  No respiratory distress  Breath sounds: Normal breath sounds  Abdominal:      General: Bowel sounds are normal       Palpations: Abdomen is soft  Tenderness: There is no abdominal tenderness  Musculoskeletal:         General: No swelling or deformity  Cervical back: Neck supple  Comments: Patient with 3 puncture wounds noted of the right forearm  There is exposed fat  Sensation is intact  Pulses 2/4 in the radial ulnar arteries   Skin:     General: Skin is warm and dry  Capillary Refill: Capillary refill takes less than 2 seconds  Neurological:      General: No focal deficit present  Mental Status: She is alert and oriented to person, place, and time   Mental status is at baseline  Psychiatric:      Comments: Patient crying and anxious  Vital Signs  ED Triage Vitals   Temperature Pulse Respirations Blood Pressure SpO2   04/02/23 1731 04/02/23 1731 04/02/23 1731 04/02/23 1734 04/02/23 1731   98 6 °F (37 °C) 73 19 (!) 167/104 100 %      Temp src Heart Rate Source Patient Position - Orthostatic VS BP Location FiO2 (%)   -- -- -- -- --             Pain Score       04/02/23 1731       10 - Worst Possible Pain           Vitals:    04/02/23 1731 04/02/23 1734   BP:  (!) 167/104   Pulse: 73          Visual Acuity      ED Medications  Medications   LORazepam (ATIVAN) tablet 0 5 mg (has no administration in time range)   clindamycin (CLEOCIN) capsule 300 mg (has no administration in time range)   HYDROcodone-acetaminophen (NORCO) 5-325 mg per tablet 1 tablet (has no administration in time range)       Diagnostic Studies  Results Reviewed     None                 XR forearm 2 views RIGHT    (Results Pending)              Procedures  Laceration repair    Date/Time: 4/2/2023 9:41 PM  Performed by: Cari Boston DO  Authorized by: Cari Boston DO   Patient understanding: patient states understanding of the procedure being performed  Patient consent: the patient's understanding of the procedure matches consent given  Body area: upper extremity  Location details: right lower arm  Laceration length: 1 5 cm  Foreign bodies: no foreign bodies  Tendon involvement: none  Nerve involvement: none    Wound Dehiscence:  Superficial Wound Dehiscence: simple closure      Procedure Details:  Irrigation solution: Normal saline with Betadine    Skin closure: 3-0 nylon  Number of sutures: 1  Technique: simple  Approximation: loose  Approximation difficulty: simple  Dressing: 4x4 sterile gauze               ED Course                               SBIRT 20yo+    Flowsheet Row Most Recent Value   SBIRT (23 yo +)    In order to provide better care to our patients, we are screening all of our patients for alcohol and drug use  Would it be okay to ask you these screening questions? Yes Filed at: 04/02/2023 1736   Initial Alcohol Screen: US AUDIT-C     1  How often do you have a drink containing alcohol? 0 Filed at: 04/02/2023 1736   2  How many drinks containing alcohol do you have on a typical day you are drinking? 0 Filed at: 04/02/2023 1736   3a  Male UNDER 65: How often do you have five or more drinks on one occasion? 0 Filed at: 04/02/2023 1736   3b  FEMALE Any Age, or MALE 65+: How often do you have 4 or more drinks on one occassion? 0 Filed at: 04/02/2023 1736   Audit-C Score 0 Filed at: 04/02/2023 1736   GABY: How many times in the past year have you    Used an illegal drug or used a prescription medication for non-medical reasons? Never Filed at: 04/02/2023 1736                    Medical Decision Making  59-year-old female presents emergency department status post dog bite to the right arm by her parents [place] when they were trying to break up a fight between this dog and the patient's own dog  Patient's tetanus shot is not up-to-date but dogs shots are up-to-date  Patient had normal range of motion but with pain and had paresthesias to the ulnar aspect of the distal wrist and hypothenar eminence  Patient is neurovascular intact otherwise with normal range of motion but with pain  The wound was cleansed and antibiotics were given  Patient is currently allergic to Augmentin so clindamycin was used  1 stitch was placed in the patient's forearm due to a gaping wound with exposed fat but there was room left for drainage  Patient was told to take all antibiotics, Motrin or Tylenol for pain, ice to the area to help with swelling and to resist bacterial growth and stitches out in 1 week  Amount and/or Complexity of Data Reviewed  Radiology: ordered and independent interpretation performed  Risk  Prescription drug management            Disposition  Final diagnoses: None     ED Disposition     None      Follow-up Information    None         Patient's Medications   Discharge Prescriptions    No medications on file       No discharge procedures on file      PDMP Review     None          ED Provider  Electronically Signed by           Daja Hyman DO  04/02/23 3461

## 2023-04-22 ENCOUNTER — APPOINTMENT (OUTPATIENT)
Dept: LAB | Facility: CLINIC | Age: 28
End: 2023-04-22

## 2023-04-22 DIAGNOSIS — E53.8 VITAMIN B12 DEFICIENCY (NON ANEMIC): ICD-10-CM

## 2023-04-22 DIAGNOSIS — E55.9 VITAMIN D DEFICIENCY: ICD-10-CM

## 2023-04-22 LAB
25(OH)D3 SERPL-MCNC: 10.8 NG/ML (ref 30–100)
VIT B12 SERPL-MCNC: 508 PG/ML (ref 100–900)

## 2023-04-26 ENCOUNTER — TELEPHONE (OUTPATIENT)
Dept: PSYCHIATRY | Facility: CLINIC | Age: 28
End: 2023-04-26

## 2023-04-26 NOTE — TELEPHONE ENCOUNTER
Pts mother called in seeking treatment for the pt  Do to no communication consent on file writer could not speak with the pts mother  Pts mother stated they will call back tomorrow  Pts mother also stated the pt is in need of a liver transplant

## 2023-06-13 ENCOUNTER — TELEPHONE (OUTPATIENT)
Dept: INTERNAL MEDICINE CLINIC | Facility: CLINIC | Age: 28
End: 2023-06-13

## 2023-06-13 NOTE — TELEPHONE ENCOUNTER
Mother of patient called again to make the appointment for new patient  I did give her an appointment for 6/23 but I told Mother to call me back tomorrow just to confirm it    She said that the waiting list for a new psychiatrist is months long and that patient needs her Trintellix and cannot go without it and that's why Dr Pallavi Amaya was prescribing it since no longer going to past psychiatrist

## 2023-06-28 ENCOUNTER — TELEPHONE (OUTPATIENT)
Dept: PSYCHIATRY | Facility: CLINIC | Age: 28
End: 2023-06-28

## 2023-06-28 NOTE — TELEPHONE ENCOUNTER
Patient contacted office in attempts to schedule med mgmt appointment at [place] spoke w. Patient and was able to schedule accordingly.  Writer acknowledge that appointment was further out but if in need of a crisis the [facility] was a available resource

## 2023-06-28 NOTE — TELEPHONE ENCOUNTER
Behavioral Health Outpatient Intake Questions    Referred By   : self; pcp    Please advise interviewee that they need to answer all questions truthfully to allow for best care, and any misrepresentations of information may affect their ability to be seen at this clinic   => Was this discussed? Yes     If Minor Child (under age 25)    Who is/are the legal guardian(s) of the child? Is there a custody agreement? • If "YES"- Custody orders must be obtained prior to scheduling the first appointment  • In addition, Consent to Treatment must be signed by all legal guardians prior to scheduling the first appointment    • If "NO"- Consent to Treatment must be signed by all legal guardians prior to scheduling the first appointment      [address] History -     Presenting Problem (in patient's own words): MMD, CPTSD, Boderline personality disorder    Are there any communication barriers for this patient? No                                               If yes, please describe barriers:   • If there is a unique situation, please refer to [address] for final determination. Are you taking any psychiatric medications? Yes   •   If "YES" -What are they Ativan   •   If "YES" -Who prescribes? Previous psychiatrist    Has the Patient previously received outpatient Talk Therapy or Medication Management from [facility]  No     •    If "YES"- When, Where and with Whom? •    If "NO" -Has Patient received these services elsewhere? •   If "YES" -When, Where, and with Whom?  [facility] in [address] [place] [address]  - Medication management    Has the Patient abused alcohol or other substances in the last 6 months ? No  No concerns of substance abuse are reported. •  If "YES" -What substance, How much, How often? •  If illegal substance: Refer to [facility] (for [facility]) or [employer].   •  If Alcohol in excess of 10 drinks per week:  Refer to [facility] (for [facility]) or [facility]    Legal History-     Is this treatment court ordered? No   If "yes "send to :  • Talk Therapy : Send to [address] for final determination   • Med Management: Send to Dr Nkechi Núñez for final determination     Has the Patient been convicted of a felony? No   If "Yes" send to -When, What? • Talk Therapy : Send to [address] for final determination   • Med Management: Send to Dr Nkechi Núñez for final determination     ACCEPTED as a patient Yes  • If "Yes" Appointment Date: 8/14 @     Referred Elsewhere? No  • If “Yes” - (Where? Ex: [facility], [facility], [address] [place] [address], etc.)       Name of Insurance Co: [address] ID# [id number]   Insurance Phone #  If ins is primary or secondary? If patient is a minor, parents information such as Name, D. O.B of guarantor.

## 2023-07-06 ENCOUNTER — APPOINTMENT (OUTPATIENT)
Dept: LAB | Facility: CLINIC | Age: 28
End: 2023-07-06
Payer: COMMERCIAL

## 2023-07-06 DIAGNOSIS — D69.6 THROMBOCYTOPENIA (HCC): ICD-10-CM

## 2023-07-06 DIAGNOSIS — D50.8 OTHER IRON DEFICIENCY ANEMIA: ICD-10-CM

## 2023-07-06 LAB
BASOPHILS # BLD AUTO: 0.02 THOUSANDS/ÂΜL (ref 0–0.1)
BASOPHILS NFR BLD AUTO: 0 % (ref 0–1)
EOSINOPHIL # BLD AUTO: 0.22 THOUSAND/ÂΜL (ref 0–0.61)
EOSINOPHIL NFR BLD AUTO: 4 % (ref 0–6)
ERYTHROCYTE [DISTWIDTH] IN BLOOD BY AUTOMATED COUNT: 16.9 % (ref 11.6–15.1)
FERRITIN SERPL-MCNC: 337 NG/ML (ref 11–307)
HCT VFR BLD AUTO: 41.5 % (ref 34.8–46.1)
HGB BLD-MCNC: 13.5 G/DL (ref 11.5–15.4)
IMM GRANULOCYTES # BLD AUTO: 0.01 THOUSAND/UL (ref 0–0.2)
IMM GRANULOCYTES NFR BLD AUTO: 0 % (ref 0–2)
IRON SERPL-MCNC: 89 UG/DL (ref 50–170)
LYMPHOCYTES # BLD AUTO: 1.46 THOUSANDS/ÂΜL (ref 0.6–4.47)
LYMPHOCYTES NFR BLD AUTO: 27 % (ref 14–44)
MCH RBC QN AUTO: 26.6 PG (ref 26.8–34.3)
MCHC RBC AUTO-ENTMCNC: 32.5 G/DL (ref 31.4–37.4)
MCV RBC AUTO: 82 FL (ref 82–98)
MONOCYTES # BLD AUTO: 0.41 THOUSAND/ÂΜL (ref 0.17–1.22)
MONOCYTES NFR BLD AUTO: 8 % (ref 4–12)
NEUTROPHILS # BLD AUTO: 3.31 THOUSANDS/ÂΜL (ref 1.85–7.62)
NEUTS SEG NFR BLD AUTO: 61 % (ref 43–75)
NRBC BLD AUTO-RTO: 0 /100 WBCS
PLATELET # BLD AUTO: 95 THOUSANDS/UL (ref 149–390)
PMV BLD AUTO: 9.8 FL (ref 8.9–12.7)
RBC # BLD AUTO: 5.07 MILLION/UL (ref 3.81–5.12)
TIBC SERPL-MCNC: 340 UG/DL (ref 250–450)
WBC # BLD AUTO: 5.43 THOUSAND/UL (ref 4.31–10.16)

## 2023-07-06 PROCEDURE — 83540 ASSAY OF IRON: CPT

## 2023-07-06 PROCEDURE — 36415 COLL VENOUS BLD VENIPUNCTURE: CPT

## 2023-07-06 PROCEDURE — 83550 IRON BINDING TEST: CPT

## 2023-07-06 PROCEDURE — 85025 COMPLETE CBC W/AUTO DIFF WBC: CPT

## 2023-07-06 PROCEDURE — 82728 ASSAY OF FERRITIN: CPT

## 2023-07-19 ENCOUNTER — OFFICE VISIT (OUTPATIENT)
Dept: BEHAVIORAL/MENTAL HEALTH CLINIC | Facility: CLINIC | Age: 28
End: 2023-07-19

## 2023-07-19 ENCOUNTER — DOCUMENTATION (OUTPATIENT)
Dept: BEHAVIORAL/MENTAL HEALTH CLINIC | Facility: CLINIC | Age: 28
End: 2023-07-19

## 2023-07-19 DIAGNOSIS — F06.4 ANXIETY DISORDER DUE TO GENERAL MEDICAL CONDITION: Primary | ICD-10-CM

## 2023-07-19 NOTE — PROGRESS NOTES
Assessment      Crisis Intake  Patient Intake  Living Arrangement: House, Lives with someone  Can patient return home?: Yes  Address to be Discharge to[de-identified] see facesheet  Patient's Telephone Number: see facesheet  Access to Firearms: No  Unemployed / MA applicant[de-identified] no insurance  Admission [address] [place] [address] of Residence: [place]  Patient History  Presenting Problems: Pt presented to [facility] reporting a need for a medication refill. Pt is currently in between psychiatric providers, last seeing Dr. Angelica Rudolph w/ [facility] in May, w/pending new pt appt @ [address] [place] [address] for 8/14 w/Dr Kim. Pt requesting support with obtaining a prescription for lorazepam, 1mg, TID. Pt reports no SI/HI/AH/VH. Pt states she is just overwhelmed & stressed, and has many medical issues. Pt reports she sees a liver specialist and has had a blood clot, among other things. Pt signed PEARL for Dr. Angelica Rudolph, psychiatrist, and Dr. Steve Bills, liver specialist, to see if either would be willing to support prescribing requested medications until her upcoming psychiatry appt. Pt also requesting outreach to prior PCP Dr. Rafat Garcia for support. Pt informed that [address] [place] [address] is not an option, as Lorazepam is a controlled substance and [facility] does not fill those types of medications. Pt requesting to be seen sooner than 8/14, if at all possible. Treatment History: Prior psych-Dr. Angelica Rudolph, [facility]. Will be a new patient at [facility] on 7/31. Currently in Treatment: No  Name of ICM[de-identified] n/a  ICM Phone Number[de-identified] n/a  Community Agency Supports: none reported  Medical Problems: see medical record  Legal Issues: none reported  Probation/ Name (if applicable): n/a  Substance Abuse: No  Mental Status Exam  Orientation Level: Appropriate for age, Oriented X4  Affect: Appropriate  Speech: Logical/coherent  Mood: Anxious  Thought Content: Appropriate  Hallucination Type: No problems reported or observed.   Judgement: Fair  Impulse Control: Fair  Attention Span: Appropriate for age  Memory: Intact  Appetite: Fair  Sleep: Fair  Appear/Hygiene: Neatly Groomed  ADL Comments: independent  Strengths and Limitations  Patient Strengths: Motivated, Negotiates basic needs, Good support system, Cooperative  Patient Limitations: Difficulty adapting, Poor physical health  Ideations  Current Self Harm/Suicidal Ideation: No  Previous Self Harm/Suicidal Ideation: No  Violence Risk to Self: Denies ideation within past 6 months  Current homicidal or violent thoughts toward another: Denies ideations within past 6 months  Previous Plans to Harm Another Person: No  Violence Risk to Others: Denies within past 6 months  Previous History of Violence to Others: No  Provisional Diagnosis  Axis I: Anxiety disorder due to known physiological condition F06.4  Axis II: deferred  Axis III: see medical record  Intake Assessment Outcome  Patient Plan:  (Pt is already scheduled w/Dr. Declan Mckeon for 8/14, however, has a need to be scheduled sooner, so pt is now scheduled w/ [facility] for 7/31.)    C-SSRS  [place] Suicide Severity Rating Scale  C-SSRS Q1. Wish to be Dead: No - In the Past Month  *Risk Level*: Low Risk      Patient was referred by: Self or Family    Visit start and stop times:    07/19/23  Start Time: 1130  Stop Time: 1230  Total Visit Time: 60 minutes      Patient scheduled for psychiatry with [facility] for 7/31 at 2pm. Prior appt w/Dr. Declan Mckeon on 8/14 has been CANCELLED.

## 2023-07-20 ENCOUNTER — TELEPHONE (OUTPATIENT)
Dept: PSYCHIATRY | Facility: CLINIC | Age: 28
End: 2023-07-20

## 2023-07-20 NOTE — TELEPHONE ENCOUNTER
Telephone call to PT following up from [facility] visit. PT reports feeling "much better today." PT's mother received a voicemail from [facility] stating that they will complete a telephone consult for the medication refill. PT thanked CM for following up and denies needing anything additional at this time.

## 2023-07-31 ENCOUNTER — TELEPHONE (OUTPATIENT)
Dept: PSYCHIATRY | Facility: CLINIC | Age: 28
End: 2023-07-31

## 2023-07-31 ENCOUNTER — OFFICE VISIT (OUTPATIENT)
Dept: PSYCHIATRY | Facility: CLINIC | Age: 28
End: 2023-07-31
Payer: COMMERCIAL

## 2023-07-31 DIAGNOSIS — F33.1 MODERATE EPISODE OF RECURRENT MAJOR DEPRESSIVE DISORDER (HCC): Primary | ICD-10-CM

## 2023-07-31 DIAGNOSIS — F43.12 CHRONIC POST-TRAUMATIC STRESS DISORDER (PTSD): ICD-10-CM

## 2023-07-31 DIAGNOSIS — F60.3 BORDERLINE PERSONALITY DISORDER (HCC): ICD-10-CM

## 2023-07-31 DIAGNOSIS — F41.9 ANXIETY: ICD-10-CM

## 2023-07-31 PROBLEM — K80.20 CALCULUS OF GALLBLADDER WITHOUT CHOLECYSTITIS WITHOUT OBSTRUCTION: Status: ACTIVE | Noted: 2020-08-21

## 2023-07-31 PROBLEM — T14.8XXA BRUISING: Status: ACTIVE | Noted: 2019-01-31

## 2023-07-31 PROBLEM — I81 PVT (PORTAL VEIN THROMBOSIS): Status: ACTIVE | Noted: 2017-02-03

## 2023-07-31 PROBLEM — K92.2 UPPER GI BLEED: Status: ACTIVE | Noted: 2020-11-09

## 2023-07-31 PROBLEM — D50.9 IRON DEFICIENCY ANEMIA: Status: ACTIVE | Noted: 2019-01-31

## 2023-07-31 PROBLEM — K63.9 DISORDER OF INTESTINE: Status: ACTIVE | Noted: 2019-02-05

## 2023-07-31 PROBLEM — K81.9 CHOLECYSTITIS: Status: ACTIVE | Noted: 2021-07-28

## 2023-07-31 PROBLEM — D64.9 ANEMIA: Status: ACTIVE | Noted: 2019-02-05

## 2023-07-31 PROBLEM — R10.13 EPIGASTRIC PAIN: Status: ACTIVE | Noted: 2019-02-27

## 2023-07-31 PROBLEM — K31.89 EROSIVE GASTROPATHY: Status: ACTIVE | Noted: 2019-12-01

## 2023-07-31 PROBLEM — Z90.3 HISTORY OF SLEEVE GASTRECTOMY: Status: ACTIVE | Noted: 2020-05-12

## 2023-07-31 PROBLEM — G89.29 CHRONIC ABDOMINAL PAIN: Status: ACTIVE | Noted: 2017-02-28

## 2023-07-31 PROBLEM — R10.9 CHRONIC ABDOMINAL PAIN: Status: ACTIVE | Noted: 2017-02-28

## 2023-07-31 PROBLEM — I85.00 ESOPHAGEAL VARICES (HCC): Status: ACTIVE | Noted: 2019-03-18

## 2023-07-31 PROCEDURE — 90792 PSYCH DIAG EVAL W/MED SRVCS: CPT

## 2023-07-31 RX ORDER — VORTIOXETINE 20 MG/1
20 TABLET, FILM COATED ORAL DAILY
COMMUNITY
Start: 2023-07-25 | End: 2023-07-31 | Stop reason: SDUPTHER

## 2023-07-31 RX ORDER — VORTIOXETINE 20 MG/1
20 TABLET, FILM COATED ORAL DAILY
Qty: 30 TABLET | Refills: 2 | Status: SHIPPED | OUTPATIENT
Start: 2023-07-31

## 2023-07-31 NOTE — BH TREATMENT PLAN
TREATMENT PLAN (Medication Management Only)        [address] [facility]    Name and Date of Birth:  Jacoby Huston 34 y.o. 1995  Date of Treatment Plan: July 31, 2023  Diagnosis/Diagnoses:    1. Anxiety      Strengths/Personal Resources for Self-Care: taking medications as prescribed, ability to communicate needs, being resoureceful, self-reliance, work skills. Area/Areas of need (in own words): anxiety symptoms, anger control  1. Long Term Goal: improve control of acceptable anxiety level. Target Date:6 months - 1/31/2024  Person/Persons responsible for completion of goal: Michaela  2. Short Term Objective (s) - How will we reach this goal?:   A. Provider new recommended medication/dosage changes and/or continue medication(s): continue current medications as prescribed. B. Attend medication management appointments regularly. C. Take psychiatric medications responsibly. Target Date:6 months - 1/31/2024  Person/Persons Responsible for Completion of Goal: Michaela  Progress Towards Goals: starting treatment  Treatment Modality: medication management every 1 months  Review due 180 days from date of this plan: 6 months - 1/31/2024  Expected length of service: ongoing treatment  My Physician/PA/NP and I have developed this plan together and I agree to work on the goals and objectives. I understand the treatment goals that were developed for my treatment.
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
## 2023-08-18 DIAGNOSIS — F41.9 ANXIETY: Primary | ICD-10-CM

## 2023-08-18 RX ORDER — LORAZEPAM 1 MG/1
1 TABLET ORAL 3 TIMES DAILY PRN
Qty: 90 TABLET | Refills: 0 | Status: SHIPPED | OUTPATIENT
Start: 2023-08-18

## 2023-09-13 ENCOUNTER — OFFICE VISIT (OUTPATIENT)
Dept: PSYCHIATRY | Facility: CLINIC | Age: 28
End: 2023-09-13
Payer: COMMERCIAL

## 2023-09-13 ENCOUNTER — TELEPHONE (OUTPATIENT)
Dept: PSYCHIATRY | Facility: CLINIC | Age: 28
End: 2023-09-13

## 2023-09-13 DIAGNOSIS — F33.1 MODERATE EPISODE OF RECURRENT MAJOR DEPRESSIVE DISORDER (HCC): Primary | ICD-10-CM

## 2023-09-13 DIAGNOSIS — F60.3 BORDERLINE PERSONALITY DISORDER (HCC): ICD-10-CM

## 2023-09-13 DIAGNOSIS — F41.9 ANXIETY: ICD-10-CM

## 2023-09-13 DIAGNOSIS — F43.12 CHRONIC POST-TRAUMATIC STRESS DISORDER (PTSD): ICD-10-CM

## 2023-09-13 PROCEDURE — 99213 OFFICE O/P EST LOW 20 MIN: CPT

## 2023-09-13 RX ORDER — LORAZEPAM 1 MG/1
1 TABLET ORAL 3 TIMES DAILY PRN
Qty: 90 TABLET | Refills: 0 | Status: SHIPPED | OUTPATIENT
Start: 2023-09-13

## 2023-09-13 NOTE — TELEPHONE ENCOUNTER
Prior Authorization for Vraylar 1.5 MG capsule faxed to [facility] via [id number]. Decision pending.

## 2023-09-13 NOTE — PSYCH
Regular Visit    Problem List Items Addressed This Visit        Other    Anxiety    Relevant Medications    LORazepam (ATIVAN) 1 mg tablet   Other Visit Diagnoses     Moderate episode of recurrent major depressive disorder ([address])    -  Primary    Relevant Medications    LORazepam (ATIVAN) 1 mg tablet    Borderline personality disorder (HCC)        Relevant Medications    LORazepam (ATIVAN) 1 mg tablet    Chronic post-traumatic stress disorder (PTSD)        Relevant Medications    LORazepam (ATIVAN) 1 mg tablet             Encounter provider PEDRITO Lynch    Provider located at    [address] [address] [phone]    Recent Visits  No visits were found meeting these conditions. Showing recent visits within past 7 days and meeting all other requirements  Today's Visits  Date Type Provider Dept   09/13/23 Office Visit PEDRITO Lynch  [facility]   Showing today's visits and meeting all other requirements  Future Appointments  No visits were found meeting these conditions. Showing future appointments within next 150 days and meeting all other requirements       HPI     Current Outpatient Medications   Medication Sig Dispense Refill   • LORazepam (ATIVAN) 1 mg tablet Take 1 tablet (1 mg total) by mouth 3 (three) times a day as needed for anxiety 90 tablet 0   • cariprazine (Vraylar) 1.5 MG capsule Take 1 capsule (1.5 mg total) by mouth daily 30 capsule 1   • nadolol (CORGARD) 40 mg tablet   0   • OMEPRAZOLE PO 20 mg     • Trintellix 20 MG tablet Take 1 tablet (20 mg total) by mouth in the morning 30 tablet 2     No current facility-administered medications for this visit.        Review of Systems        MEDICATION MANAGEMENT NOTE        [address]    Name and Date of Birth:  Clement Lorenzana 34 y.o. 1995 MRN: [id number]    Date of Visit: September 13, 2023    Allergies   Allergen Reactions   • Augmentin [Amoxicillin-Pot Clavulanate] Abdominal Pain   • Tamiflu [Oseltamivir] Diarrhea     SUBJECTIVE:    Rosa Coronado is seen today for a follow up for Major Depressive Disorder, PTSD and Borderline Personality Disorder. She continues to do fairly well since the last visit. Patient discussing her difficult living situation with parents, father alcoholic difficult to deal with his behaviors, patient stays in her room most of the day, feels claustrophobic. She is hopeful and optimistic regarding her plan to move out of the house-her and fiancé currently shopping for apartments and goal is to move out by the end of the year, if money allows such. Believes her symptoms of irritability, anger will greatly improve when she is away for her parents and has a sense of freedom. Continues to struggle with severe anxiety and mild anger outburst, continues to utilize lorazepam appropriately with no adverse effects. She has been unable to  Vraylar 1.5 mg daily for depressive symptoms and mood stabilization due to insurance, will initiate prior auth today. Reports chronic depression 7/10 with dysphoria, irritability, lack of energy motivation-mostly attributed to current living environment. Continues Trintellix 20 mg daily for symptoms. Would benefit from increased coping skills and emotional regulation, remains on psychotherapy list and looks forward to beginning. Mood was euthymic, bright when talking about her future apartment plans. Denies SI/HI. She denies any side effects from medications.     PLAN:     - Initiate Vraylar 1.5 mg daily for mood stabilization and to augment antidepressant for treatment resistant depression     - Continue Trintellix 20 mg daily for depression and anxiety management   PARQ completed including serotonin syndrome, SIADH, worsening depression, suicidality, induction of balwinder, GI upset, headaches, activation, sexual side effects, sedation, potential drug interactions, and others.      -PCP prescribing lorazepam 1 mg TID for severe anxiety-patient often only takes twice daily, will attempt to limit prescription if possible going forward. Discussed with patient the risks of sedation, respiratory depression, impairment of ability to drive and potential for abuse and addiction related to treatment with benzodiazepine medications. The patient understands risk of treatment with benzodiazepine medications, agrees to not drive if feels impaired and agrees to take medications as prescribed. Aware of 24 hour and weekend coverage for urgent situations accessed by calling [facility] main practice number  Referral for individual psychotherapy    Diagnoses and all orders for this visit:    Moderate episode of recurrent major depressive disorder ([address] [facility])    Borderline personality disorder (HCC)    Chronic post-traumatic stress disorder (PTSD)    Anxiety  -     LORazepam (ATIVAN) 1 mg tablet; Take 1 tablet (1 mg total) by mouth 3 (three) times a day as needed for anxiety        Current Outpatient Medications on File Prior to Visit   Medication Sig Dispense Refill   • cariprazine (Vraylar) 1.5 MG capsule Take 1 capsule (1.5 mg total) by mouth daily 30 capsule 1   • nadolol (CORGARD) 40 mg tablet   0   • OMEPRAZOLE PO 20 mg     • Trintellix 20 MG tablet Take 1 tablet (20 mg total) by mouth in the morning 30 tablet 2   • [DISCONTINUED] LORazepam (ATIVAN) 1 mg tablet Take 1 tablet (1 mg total) by mouth 3 (three) times a day as needed for anxiety 90 tablet 0     No current facility-administered medications on file prior to visit. HPI ROS Appetite Changes and Sleep:     She reports normal sleep, adequate appetite, low energy.  Denies homicidal ideation, denies suicidal ideation    Review Of Systems:      HPI ROS:               Medication Side Effects:  denies    Depression (10 worst): 7/10    Anxiety (10 worst): 7/10 Safety concerns (SI, HI, etc): Denies si and hi    Sleep: 6-7 hrs    Energy: Fair     Appetite: 2-3 meals    Weight Change: denies        Mental Status Evaluation:    Appearance Adequate hygiene and grooming   Behavior calm and cooperative   Mood anxious and depressed  Depression Scale - 7 of 10 (0 = No depression)  Anxiety Scale - 7 of 10 (0 = No anxiety)   Speech Normal rate and volume   Affect constricted   Thought Processes Goal directed and coherent   Thought Content Does not verbalize delusional material   Associations Tightly connected   Perceptual Disturbances Denies hallucinations and does not appear to be responding to internal stimuli   Risk Potential Suicidal/Homicidal Ideation - No evidence of suicidal or homicidal ideation and patient does not verbalize suicidal or homicidal ideation  Risk of Violence - No evidence of risk for violence found on assessment  Risk of Self Mutilation - No evidence of risk for self mutilation found on assessment   Orientation oriented to person, place, time/date and situation   Memory recent and remote memory grossly intact   Consciousness alert and awake   Attention/Concentration attention span and concentration are age appropriate   Insight intact   Judgement intact   Muscle Strength and Gait normal muscle strength and normal muscle tone, normal gait/station and normal balance   Motor Activity no abnormal movements   Language no difficulty naming common objects, no difficulty repeating a phrase, no difficulty writing a sentence   Fund of Knowledge adequate knowledge of current events  adequate fund of knowledge regarding past history  adequate fund of knowledge regarding vocabulary      Traumatic History Update:     New Onset of Abuse Since Last Encounter:   no  Traumatic Events Since Last Encounter:   no    Past Medical History:    Past Medical History:   Diagnosis Date   • Anxiety disorder    • Portal vein thrombosis         Past Surgical History:   Procedure Laterality Date   • GASTRIC BYPASS     • US GUIDED LIVER BIOPSY  6/30/2020     Allergies   Allergen Reactions   • Augmentin [Amoxicillin-Pot Clavulanate] Abdominal Pain   • Tamiflu [Oseltamivir] Diarrhea     Substance Abuse History:    Social History     Substance and Sexual Activity   Alcohol Use Never     Social History     Substance and Sexual Activity   Drug Use Never     Social History:    Social History     Socioeconomic History   • Marital status: Single     Spouse name: Not on file   • Number of children: Not on file   • Years of education: Not on file   • Highest education level: Not on file   Occupational History   • Not on file   Tobacco Use   • Smoking status: Never   • Smokeless tobacco: Never   Substance and Sexual Activity   • Alcohol use: Never   • Drug use: Never   • Sexual activity: Not on file   Other Topics Concern   • Not on file   Social History Narrative   • Not on file     Social Determinants of Health     Financial Resource Strain: Not on file   Food Insecurity: Not on file   Transportation Needs: Not on file   Physical Activity: Not on file   Stress: Not on file   Social Connections: Not on file   Intimate Partner Violence: Not on file   Housing Stability: Not on file     Family Psychiatric History:     Family History   Problem Relation Age of Onset   • Hypertension Mother    • Diabetes Mother    • Crohn's disease Mother    • No Known Problems Father      History Review: The following portions of the patient's history were reviewed and updated as appropriate: allergies, current medications, past family history, past medical history, past social history, past surgical history and problem list     OBJECTIVE:     Vital signs in last 24 hours: There were no vitals filed for this visit. Laboratory Results:   Recent Labs (last 2 months):   No visits with results within 2 Month(s) from this visit.    Latest known visit with results is:   Appointment on 07/06/2023   Component Date Value   • WBC 07/06/2023 5.43    • RBC 07/06/2023 5.07    • Hemoglobin 07/06/2023 13.5    • Hematocrit 07/06/2023 41.5    • MCV 07/06/2023 82    • MCH 07/06/2023 26.6 (L)    • MCHC 07/06/2023 32.5    • RDW 07/06/2023 16.9 (H)    • MPV 07/06/2023 9.8    • Platelets 70/11/7126 95 (L)    • nRBC 07/06/2023 0    • Neutrophils Relative 07/06/2023 61    • Immat GRANS % 07/06/2023 0    • Lymphocytes Relative 07/06/2023 27    • Monocytes Relative 07/06/2023 8    • Eosinophils Relative 07/06/2023 4    • Basophils Relative 07/06/2023 0    • Neutrophils Absolute 07/06/2023 3.31    • Immature Grans Absolute 07/06/2023 0.01    • Lymphocytes Absolute 07/06/2023 1.46    • Monocytes Absolute 07/06/2023 0.41    • Eosinophils Absolute 07/06/2023 0.22    • Basophils Absolute 07/06/2023 0.02    • TIBC 07/06/2023 340    • Iron 07/06/2023 89    • Ferritin 07/06/2023 337 (H)      I have personally reviewed all pertinent laboratory/tests results. Suicide/Homicide Risk Assessment:    Risk of Harm to Self:  Protective Factors: no current suicidal ideation, access to mental health treatment, compliant with medications, compliant with mental health treatment, connection to community, having a desire to be alive  Based on today's assessment, Johnny Campos presents the following risk of harm to self: minimal    Risk of Harm to Others:  Based on today's assessment, Johnny Campos presents the following risk of harm to others: minimal    The following interventions are recommended: referral for psychotherapy    Medications Risks/Benefits:      Risks, Benefits And Possible Side Effects Of Medications:    Discussed risks and benefits of treatment with patient including risk of suicidality, serotonin syndrome, increased QTc interval and SIADH related to treatment with antidepressants;  Risk of induction of manic symptoms in certain patient populations, risk of parkinsonian symptoms, metabolic syndrome, tardive dyskinesia and neuroleptic malignant syndrome related to treatment with antipsychotic medications and risks of misuse, abuse or dependence, sedation and respiratory depression related to treatment with benzodiazepine medications     Controlled Medication Discussion:     Andrei Prasad has been filling controlled prescriptions on time as prescribed according to [facility]  Discussed with Andrei Prasad the risks of sedation, respiratory depression, impairment of ability to drive and potential for abuse and addiction related to treatment with benzodiazepine medications. She understands risk of treatment with benzodiazepine medications, agrees to not drive if feels impaired and agrees to take medications as prescribed  Discussed with [facility] Box warning on concurrent use of benzodiazepines and opioid medications including sedation, respiratory depression, coma and death. She understands the risk of treatment with benzodiazepines in addition to opioids and wants to continue taking those medications  Discussed with Andrei Prasad increased risk of impairment related to concurrent use of benzodiazepines and hypnotic medications including excessive sedation, psychomotor impairment and respiratory depression. She understands the risk of treatment with benzodiazepines in addition to hypnotic medications and wants to continue taking those medications    Treatment Plan:    Due for update/Updated:   no  Last treatment plan done 7/31/23 . Treatment Plan due on 1/31/24.     PEDRITO Ledezma 09/13/23    Visit Time    Visit Start Time: 10  Visit Stop Time: 0139  Total Visit Duration: 25 minutes

## 2023-09-14 NOTE — TELEPHONE ENCOUNTER
Fax from [facility] stating request for PA cannot be processed due to member having alternative benefits. LM on Michaela's VM requesting she call [employer] with updated information so that PA can be re-sent. Requested she call me back after she does this so I can re-submit.

## 2023-09-20 NOTE — TELEPHONE ENCOUNTER
Prior Authorization request for Vraylar 1.5 MG capsules faxed to [facility] [employer]. No alternative benefits available.

## 2023-09-20 NOTE — TELEPHONE ENCOUNTER
Fax from [employer] once again stating that Marybeth Jackson has alternative benefits. They are unable to process request for Prior Authorization. LM on Crista's VM instructing her to call [employer] to discuss this and inform them that there are no alternative benefits available. Requested she call me back when this is resolved so that I can once again submit Prior Authorization.

## 2023-10-13 DIAGNOSIS — F41.9 ANXIETY: ICD-10-CM

## 2023-10-13 RX ORDER — LORAZEPAM 1 MG/1
1 TABLET ORAL 3 TIMES DAILY PRN
Qty: 90 TABLET | Refills: 0 | Status: SHIPPED | OUTPATIENT
Start: 2023-10-13

## 2023-11-27 DIAGNOSIS — F41.9 ANXIETY: ICD-10-CM

## 2023-11-27 RX ORDER — LORAZEPAM 1 MG/1
1 TABLET ORAL 3 TIMES DAILY PRN
Qty: 90 TABLET | Refills: 0 | Status: SHIPPED | OUTPATIENT
Start: 2023-11-27

## 2023-12-27 ENCOUNTER — OFFICE VISIT (OUTPATIENT)
Dept: PSYCHIATRY | Facility: CLINIC | Age: 28
End: 2023-12-27
Payer: COMMERCIAL

## 2023-12-27 ENCOUNTER — DOCUMENTATION (OUTPATIENT)
Dept: BEHAVIORAL/MENTAL HEALTH CLINIC | Facility: CLINIC | Age: 28
End: 2023-12-27

## 2023-12-27 DIAGNOSIS — F33.1 MODERATE EPISODE OF RECURRENT MAJOR DEPRESSIVE DISORDER (HCC): Primary | ICD-10-CM

## 2023-12-27 DIAGNOSIS — F43.12 CHRONIC POST-TRAUMATIC STRESS DISORDER (PTSD): ICD-10-CM

## 2023-12-27 DIAGNOSIS — F41.1 GENERALIZED ANXIETY DISORDER WITH PANIC ATTACKS: ICD-10-CM

## 2023-12-27 DIAGNOSIS — F60.3 BORDERLINE PERSONALITY DISORDER (HCC): ICD-10-CM

## 2023-12-27 DIAGNOSIS — F41.0 GENERALIZED ANXIETY DISORDER WITH PANIC ATTACKS: ICD-10-CM

## 2023-12-27 PROCEDURE — 99214 OFFICE O/P EST MOD 30 MIN: CPT

## 2023-12-27 RX ORDER — HYDROXYZINE 50 MG/1
50 TABLET, FILM COATED ORAL EVERY 6 HOURS PRN
Qty: 90 TABLET | Refills: 0 | Status: SHIPPED | OUTPATIENT
Start: 2023-12-27

## 2023-12-27 RX ORDER — LORAZEPAM 1 MG/1
1 TABLET ORAL 2 TIMES DAILY PRN
Qty: 60 TABLET | Refills: 0 | Status: SHIPPED | OUTPATIENT
Start: 2023-12-27

## 2023-12-27 RX ORDER — VORTIOXETINE 20 MG/1
20 TABLET, FILM COATED ORAL DAILY
Qty: 30 TABLET | Refills: 2 | Status: SHIPPED | OUTPATIENT
Start: 2023-12-27

## 2023-12-27 NOTE — PROGRESS NOTES
met with Pt and discussed possible resources she can utilize in the community. Pt was looking for possible case management services and housing. A Targeted case management referral was completed and [facility].  also was able to scheduled Pt individual therapy with Felicity for 1/12/2023 at 2 pm. Pt was on the waiting list for talk therapy since June but feels she cannot wait any longer.
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
## 2023-12-27 NOTE — BH TREATMENT PLAN
TREATMENT PLAN (Medication Management Only)        [facility]    Name and Date of Birth:  Michaela Goncalves 28 y.o. 1995  Date of Treatment Plan: December 27, 2023  Diagnosis/Diagnoses:    1. Moderate episode of recurrent major depressive disorder (HCC)    2. Borderline personality disorder (HCC)    3. Chronic post-traumatic stress disorder (PTSD)    4. Anxiety      Strengths/Personal Resources for Self-Care: taking medications as prescribed, ability to communicate needs, being resoureceful, self-reliance, work skills.  Area/Areas of need (in own words): anxiety symptoms, anger control  1. Long Term Goal: improve control of acceptable anxiety level.  Target Date:6 months - 6/27/2024  Person/Persons responsible for completion of goal: Michaela  2.  Short Term Objective (s) - How will we reach this goal?:   A. Provider new recommended medication/dosage changes and/or continue medication(s): continue current medications as prescribed.  B. Attend medication management appointments regularly.  C. Take psychiatric medications responsibly. Decrease Lorazepam usage slowly with goal to discontinue.   Target Date:6 months - 6/27/2024  Person/Persons Responsible for Completion of Goal: Michaela  Progress Towards Goals: starting treatment  Treatment Modality: medication management every 1 months  Review due 180 days from date of this plan: 6 months - 6/27/2024  Expected length of service: ongoing treatment  My Physician/PA/NP and I have developed this plan together and I agree to work on the goals and objectives. I understand the treatment goals that were developed for my treatment.

## 2024-01-12 ENCOUNTER — OFFICE VISIT (OUTPATIENT)
Dept: BEHAVIORAL/MENTAL HEALTH CLINIC | Facility: CLINIC | Age: 29
End: 2024-01-12

## 2024-01-12 DIAGNOSIS — Z91.199 NO-SHOW FOR APPOINTMENT: Primary | ICD-10-CM

## 2024-01-12 NOTE — PSYCH
No Call. No Show. No Charge    Michaela Goncalves no showed 01/12/24 appointment , [facility]  will provide patient with intake telephone number     Treatment Plan not completed within required time limits due to: Michaela Goncavles no show appointment on 01/12/24

## 2024-01-17 ENCOUNTER — HOSPITAL ENCOUNTER (EMERGENCY)
Facility: HOSPITAL | Age: 29
Discharge: LEFT BEFORE DISCHARGE | End: 2024-01-17
Attending: EMERGENCY MEDICINE
Payer: COMMERCIAL

## 2024-01-17 ENCOUNTER — APPOINTMENT (EMERGENCY)
Dept: MRI IMAGING | Facility: HOSPITAL | Age: 29
End: 2024-01-17
Payer: COMMERCIAL

## 2024-01-17 VITALS
DIASTOLIC BLOOD PRESSURE: 90 MMHG | HEART RATE: 88 BPM | TEMPERATURE: 98.2 F | SYSTOLIC BLOOD PRESSURE: 143 MMHG | OXYGEN SATURATION: 98 % | RESPIRATION RATE: 16 BRPM

## 2024-01-17 DIAGNOSIS — M54.10 RADICULOPATHY: ICD-10-CM

## 2024-01-17 DIAGNOSIS — F32.A DEPRESSION: ICD-10-CM

## 2024-01-17 DIAGNOSIS — M54.2 NECK PAIN: ICD-10-CM

## 2024-01-17 DIAGNOSIS — M54.9 BACK PAIN: Primary | ICD-10-CM

## 2024-01-17 LAB
BILIRUB UR QL STRIP: NEGATIVE
CLARITY UR: CLEAR
COLOR UR: YELLOW
EXT PREGNANCY TEST URINE: NEGATIVE
EXT. CONTROL: NORMAL
GLUCOSE UR STRIP-MCNC: NEGATIVE MG/DL
HGB UR QL STRIP.AUTO: NEGATIVE
KETONES UR STRIP-MCNC: NEGATIVE MG/DL
LEUKOCYTE ESTERASE UR QL STRIP: NEGATIVE
NITRITE UR QL STRIP: NEGATIVE
PH UR STRIP.AUTO: 7.5 [PH]
PROT UR STRIP-MCNC: NEGATIVE MG/DL
SP GR UR STRIP.AUTO: 1.02
UROBILINOGEN UR QL STRIP.AUTO: 1 E.U./DL

## 2024-01-17 PROCEDURE — 99284 EMERGENCY DEPT VISIT MOD MDM: CPT

## 2024-01-17 PROCEDURE — 72148 MRI LUMBAR SPINE W/O DYE: CPT

## 2024-01-17 PROCEDURE — 81025 URINE PREGNANCY TEST: CPT | Performed by: PHYSICIAN ASSISTANT

## 2024-01-17 PROCEDURE — 81003 URINALYSIS AUTO W/O SCOPE: CPT | Performed by: PHYSICIAN ASSISTANT

## 2024-01-17 PROCEDURE — 96374 THER/PROPH/DIAG INJ IV PUSH: CPT

## 2024-01-17 PROCEDURE — 96372 THER/PROPH/DIAG INJ SC/IM: CPT

## 2024-01-17 PROCEDURE — 99284 EMERGENCY DEPT VISIT MOD MDM: CPT | Performed by: PHYSICIAN ASSISTANT

## 2024-01-17 RX ORDER — DROPERIDOL 2.5 MG/ML
0.62 INJECTION, SOLUTION INTRAMUSCULAR; INTRAVENOUS ONCE
Status: COMPLETED | OUTPATIENT
Start: 2024-01-17 | End: 2024-01-17

## 2024-01-17 RX ORDER — LIDOCAINE 50 MG/G
1 PATCH TOPICAL DAILY
Qty: 10 PATCH | Refills: 0 | Status: SHIPPED | OUTPATIENT
Start: 2024-01-17

## 2024-01-17 RX ORDER — BACLOFEN 10 MG/1
10 TABLET ORAL 3 TIMES DAILY PRN
Qty: 12 TABLET | Refills: 0 | Status: SHIPPED | OUTPATIENT
Start: 2024-01-17 | End: 2024-01-21

## 2024-01-17 RX ORDER — KETOROLAC TROMETHAMINE 30 MG/ML
15 INJECTION, SOLUTION INTRAMUSCULAR; INTRAVENOUS ONCE
Status: COMPLETED | OUTPATIENT
Start: 2024-01-17 | End: 2024-01-17

## 2024-01-17 RX ORDER — KETOROLAC TROMETHAMINE 30 MG/ML
15 INJECTION, SOLUTION INTRAMUSCULAR; INTRAVENOUS ONCE
Status: DISCONTINUED | OUTPATIENT
Start: 2024-01-17 | End: 2024-01-17

## 2024-01-17 RX ORDER — DIAZEPAM 5 MG/ML
5 INJECTION, SOLUTION INTRAMUSCULAR; INTRAVENOUS ONCE
Status: COMPLETED | OUTPATIENT
Start: 2024-01-17 | End: 2024-01-17

## 2024-01-17 RX ADMIN — DROPERIDOL 0.62 MG: 2.5 INJECTION, SOLUTION INTRAMUSCULAR; INTRAVENOUS at 08:30

## 2024-01-17 RX ADMIN — DIAZEPAM 5 MG: 5 INJECTION INTRAMUSCULAR; INTRAVENOUS at 10:17

## 2024-01-17 RX ADMIN — KETOROLAC TROMETHAMINE 15 MG: 30 INJECTION, SOLUTION INTRAMUSCULAR; INTRAVENOUS at 11:17
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
## 2024-01-17 NOTE — RESULT ENCOUNTER NOTE
Spoke with Crista patients mother made her aware of the MRI findings.  I did receive verbal consent from the patient to report the results to her mother and placed information in my chart for the patient which I did
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
## 2024-02-01 DIAGNOSIS — F41.0 GENERALIZED ANXIETY DISORDER WITH PANIC ATTACKS: ICD-10-CM

## 2024-02-01 DIAGNOSIS — F41.1 GENERALIZED ANXIETY DISORDER WITH PANIC ATTACKS: ICD-10-CM

## 2024-02-01 RX ORDER — LORAZEPAM 1 MG/1
1 TABLET ORAL 2 TIMES DAILY PRN
Qty: 60 TABLET | Refills: 0 | Status: SHIPPED | OUTPATIENT
Start: 2024-02-01

## 2024-02-01 RX ORDER — HYDROXYZINE 50 MG/1
50 TABLET, FILM COATED ORAL EVERY 6 HOURS PRN
Qty: 90 TABLET | Refills: 0 | Status: SHIPPED | OUTPATIENT
Start: 2024-02-01

## 2024-02-09 ENCOUNTER — TELEMEDICINE (OUTPATIENT)
Dept: PSYCHIATRY | Facility: CLINIC | Age: 29
End: 2024-02-09

## 2024-02-09 DIAGNOSIS — F41.0 GENERALIZED ANXIETY DISORDER WITH PANIC ATTACKS: Primary | ICD-10-CM

## 2024-02-09 DIAGNOSIS — F60.3 BORDERLINE PERSONALITY DISORDER (HCC): ICD-10-CM

## 2024-02-09 DIAGNOSIS — Z91.199 NO-SHOW FOR APPOINTMENT: ICD-10-CM

## 2024-02-09 DIAGNOSIS — F41.1 GENERALIZED ANXIETY DISORDER WITH PANIC ATTACKS: Primary | ICD-10-CM

## 2024-02-09 DIAGNOSIS — F33.1 MODERATE EPISODE OF RECURRENT MAJOR DEPRESSIVE DISORDER (HCC): ICD-10-CM

## 2024-02-09 PROCEDURE — NOSHOW

## 2024-02-09 NOTE — PSYCH
No Call. No Show. No Charge    Michaela Goncalves no showed 02/09/24 appointment , staff called and left message to reschedule appointment     Treatment Plan not due at this session.

## 2024-03-01 ENCOUNTER — TELEPHONE (OUTPATIENT)
Dept: PSYCHIATRY | Facility: CLINIC | Age: 29
End: 2024-03-01

## 2024-03-01 DIAGNOSIS — F33.1 MODERATE EPISODE OF RECURRENT MAJOR DEPRESSIVE DISORDER (HCC): ICD-10-CM

## 2024-03-01 RX ORDER — NADOLOL 40 MG/1
TABLET ORAL
Refills: 0 | OUTPATIENT
Start: 2024-03-01

## 2024-03-01 RX ORDER — VORTIOXETINE 20 MG/1
20 TABLET, FILM COATED ORAL DAILY
Qty: 30 TABLET | Refills: 2 | Status: SHIPPED | OUTPATIENT
Start: 2024-03-01

## 2024-03-01 NOTE — TELEPHONE ENCOUNTER
Fax from [employer] requesting completion of PA for Vraylar 1.5 MG capsule.     Prior Authorization for Vraylar 1.5 MG capsule faxed to [facility] via [employer].  Decision pending.

## 2024-03-04 NOTE — TELEPHONE ENCOUNTER
Fax from [employer] stating that they are the payor of last resort and primary insurance will need to be billed first.      LM on Michaela's VM requesting she call [employer] to get this resolved.  Teams number provided for call back.

## 2024-03-06 DIAGNOSIS — F41.0 GENERALIZED ANXIETY DISORDER WITH PANIC ATTACKS: ICD-10-CM

## 2024-03-06 DIAGNOSIS — F41.1 GENERALIZED ANXIETY DISORDER WITH PANIC ATTACKS: ICD-10-CM

## 2024-03-07 RX ORDER — LORAZEPAM 1 MG/1
1 TABLET ORAL 2 TIMES DAILY PRN
Qty: 60 TABLET | Refills: 0 | Status: SHIPPED | OUTPATIENT
Start: 2024-03-07

## 2024-03-14 ENCOUNTER — TELEPHONE (OUTPATIENT)
Dept: PSYCHIATRY | Facility: CLINIC | Age: 29
End: 2024-03-14

## 2024-03-14 NOTE — TELEPHONE ENCOUNTER
Pharmacy called in requesting a prior authorization for this patients medication.    Writer made provider and office clinincal staff aware of the request.     The best number for the [facility] pharmacy is [phone], Option 3, request to speak to Maria Guadalupe or Dileep.

## 2024-03-15 NOTE — TELEPHONE ENCOUNTER
Called [facility] and spoke to Maria Guadalupe.  Informed her that a PA was done for the Vraylar but insurance is rejecting it due to a primary insurance.  Informed Maria Guadalupe that I have left messages for Michaela to call [employer] to clarify but have not heard back from her.    LM another message on Michaela's VM requesting she call me back to discuss insurance and also requesting that she call [employer] to remove primary benefits.

## 2024-04-11 DIAGNOSIS — F41.0 GENERALIZED ANXIETY DISORDER WITH PANIC ATTACKS: ICD-10-CM

## 2024-04-11 DIAGNOSIS — F41.1 GENERALIZED ANXIETY DISORDER WITH PANIC ATTACKS: ICD-10-CM

## 2024-04-12 RX ORDER — LORAZEPAM 1 MG/1
1 TABLET ORAL 2 TIMES DAILY PRN
Qty: 60 TABLET | Refills: 0 | Status: SHIPPED | OUTPATIENT
Start: 2024-04-12

## 2024-05-22 DIAGNOSIS — F33.1 MODERATE EPISODE OF RECURRENT MAJOR DEPRESSIVE DISORDER (HCC): ICD-10-CM

## 2024-05-22 DIAGNOSIS — F41.1 GENERALIZED ANXIETY DISORDER WITH PANIC ATTACKS: ICD-10-CM

## 2024-05-22 DIAGNOSIS — F41.0 GENERALIZED ANXIETY DISORDER WITH PANIC ATTACKS: ICD-10-CM

## 2024-05-23 RX ORDER — VORTIOXETINE 20 MG/1
20 TABLET, FILM COATED ORAL DAILY
Qty: 30 TABLET | Refills: 2 | Status: SHIPPED | OUTPATIENT
Start: 2024-05-23

## 2024-05-23 RX ORDER — HYDROXYZINE 50 MG/1
50 TABLET, FILM COATED ORAL EVERY 6 HOURS PRN
Qty: 90 TABLET | Refills: 0 | Status: SHIPPED | OUTPATIENT
Start: 2024-05-23

## 2024-05-23 RX ORDER — LORAZEPAM 1 MG/1
1 TABLET ORAL 2 TIMES DAILY PRN
Qty: 60 TABLET | Refills: 0 | Status: SHIPPED | OUTPATIENT
Start: 2024-05-23 | End: 2024-05-30

## 2024-05-30 ENCOUNTER — TELEMEDICINE (OUTPATIENT)
Dept: PSYCHIATRY | Facility: CLINIC | Age: 29
End: 2024-05-30

## 2024-05-30 DIAGNOSIS — F60.3 BORDERLINE PERSONALITY DISORDER (HCC): ICD-10-CM

## 2024-05-30 DIAGNOSIS — F33.1 MODERATE EPISODE OF RECURRENT MAJOR DEPRESSIVE DISORDER (HCC): Primary | ICD-10-CM

## 2024-05-30 DIAGNOSIS — F41.0 GENERALIZED ANXIETY DISORDER WITH PANIC ATTACKS: ICD-10-CM

## 2024-05-30 DIAGNOSIS — F41.1 GENERALIZED ANXIETY DISORDER WITH PANIC ATTACKS: ICD-10-CM

## 2024-05-30 PROBLEM — K29.00 ACUTE GASTRITIS WITHOUT HEMORRHAGE: Status: ACTIVE | Noted: 2024-05-30

## 2024-05-30 PROBLEM — K20.90 ESOPHAGITIS: Status: ACTIVE | Noted: 2024-05-30

## 2024-05-30 PROBLEM — I85.00 ESOPHAGEAL VARICES DETERMINED BY ENDOSCOPY (HCC): Status: ACTIVE | Noted: 2024-05-30

## 2024-05-30 PROBLEM — I81 PORTAL VEIN THROMBOSIS: Status: ACTIVE | Noted: 2024-05-30

## 2024-05-30 PROBLEM — K76.6 PORTAL HYPERTENSION (HCC): Status: ACTIVE | Noted: 2024-05-30

## 2024-05-30 PROBLEM — K76.0 FATTY LIVER: Status: ACTIVE | Noted: 2024-05-30

## 2024-05-30 RX ORDER — LORAZEPAM 1 MG/1
1.5 TABLET ORAL DAILY PRN
Start: 2024-05-30

## 2024-05-30 RX ORDER — MEDROXYPROGESTERONE ACETATE 150 MG/ML
INJECTION, SUSPENSION INTRAMUSCULAR
COMMUNITY
Start: 2024-04-28

## 2024-05-30 NOTE — PSYCH
Virtual Regular Visit    Verification of patient location:    Patient is located in the following state in which I hold an active license [place]    Problem List Items Addressed This Visit    None  Visit Diagnoses       Borderline personality disorder (HCC)    -  Primary    Moderate episode of recurrent major depressive disorder (HCC)        Generalized anxiety disorder with panic attacks                   Encounter provider PEDRITO De Dios    Provider located at    [facility]  [address] [facility] [place] [address] [phone]    Recent Visits  No visits were found meeting these conditions.  Showing recent visits within past 7 days and meeting all other requirements  Future Appointments  No visits were found meeting these conditions.  Showing future appointments within next 150 days and meeting all other requirements         The patient was identified by name and date of birth. Michaela BROCK Theainger was informed that this is a telemedicine visit and that the visit is being conducted throughthe Epic Embedded platform. She agrees to proceed..  My office door was closed. No one else was in the room.  She acknowledged consent and understanding of privacy and security of the video platform. The patient has agreed to participate and understands they can discontinue the visit at any time.    Patient is aware this is a billable service.     HPI     Current Outpatient Medications   Medication Sig Dispense Refill    baclofen 10 mg tablet Take 1 tablet (10 mg total) by mouth 3 (three) times a day as needed for muscle spasms for up to 4 days 12 tablet 0    cariprazine (Vraylar) 1.5 MG capsule Take 1 capsule (1.5 mg total) by mouth daily 30 capsule 1    hydrOXYzine HCL (ATARAX) 50 mg tablet Take 1 tablet (50 mg total) by mouth every 6 (six) hours as needed for anxiety (For severe anxiety) 90 tablet 0    lidocaine (Lidoderm) 5 % Apply 1 patch topically over 12 hours  daily Remove & Discard patch within 12 hours or as directed by MD 10 patch 0    LORazepam (ATIVAN) 1 mg tablet Take 1 tablet (1 mg total) by mouth 2 (two) times a day as needed for anxiety 60 tablet 0    nadolol (CORGARD) 40 mg tablet   0    OMEPRAZOLE PO 20 mg      Trintellix 20 MG tablet Take 1 tablet (20 mg total) by mouth in the morning 30 tablet 2     No current facility-administered medications for this visit.       Review of Systems  Video Exam    There were no vitals filed for this visit.    Physical Exam   As a result of this visit, I have referred the patient for further respiratory evaluation. No      VIRTUAL VISIT DISCLAIMER    Michaela Goncalves acknowledges that she has consented to an online visit or consultation. She understands that the online visit is based solely on information provided by her, and that, in the absence of a face-to-face physical evaluation by the physician, the diagnosis she receives is both limited and provisional in terms of accuracy and completeness. This is not intended to replace a full medical face-to-face evaluation by the physician. Michaela Goncalves understands and accepts these terms.    MEDICATION MANAGEMENT NOTE        [facility]    Name and Date of Birth:  Michaela Goncalves 29 y.o. 1995 MRN: [id number]    Date of Visit: May 30, 2024    Allergies   Allergen Reactions    Augmentin [Amoxicillin-Pot Clavulanate] Abdominal Pain    Tamiflu [Oseltamivir] Diarrhea     SUBJECTIVE:    Michaela is seen today for a follow up for Major Depressive Disorder and Borderline Personality Disorder. She reports that she continues to improve slowly since the last visit.  Patient shares she has yet to  Vraylar 1.5 mg daily to augment SSRI-insurances claiming she has a secondary insurance which she is stating she does not have, Sumaya Valle has reached out to patient to attempt to help patient, patient is yet to return call and  encouraged her to do so today.  Patient is still interested in Vraylar to help with chronic depression.  Patient does appear to be in improved spirits compared to previous visits, mood is brighter, more talkative, more hopeful for the future.  Attributes improvement to the weather, spending more time doing her hobbies such as bird collecting.  Reports chronic 5/10 depression with periods of dysphoria, anhedonia, lack of energy and motivation, decreased appetite.  Anxiety continues to wax and wane depending on family conflict-has made good progress in terms of decreasing lorazepam usage, she is attempting to use only once daily as needed for severe anxiety.  Will decrease daily dosage to lorazepam 1.5 mg daily panic level anxiety only and patient agreed to plan, she is to utilize as needed Atarax and replacement which she denies side effects from.  Continues Trintellix 20 mg daily.  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  Boyfriend remains her strong support system.  Denies SI and HI.    She denies any side effects from medications.    PLAN:     - Has yet to Initiate Vraylar 1.5 mg daily for mood stabilization and to augment antidepressant for treatment resistant depression, has yet to call her insurance to get secondary insurance figured out-Sumaya Castellanos left message, pt never returned call, encouraged her to do so today.       - Continue Trintellix 20 mg daily for depression and anxiety management   PARQ completed including serotonin syndrome, SIADH, worsening depression, suicidality, induction of balwinder, GI upset, headaches, activation, sexual side effects, sedation, potential drug interactions, and others.      -Decrease Lorazepam to 1.5mg daily for severe anxiety only.    -May utilize prn atarax 50mg in replacement for severe stressors and anxiety.     Discussed with patient the risks of  sedation, respiratory depression, impairment of ability to drive and potential for abuse and addiction related to treatment with benzodiazepine medications. The patient understands risk of treatment with benzodiazepine medications, agrees to not drive if feels impaired and agrees to take medications as prescribed.          Aware of 24 hour and weekend coverage for urgent situations accessed by calling [facility] main practice number  Referral for individual psychotherapy    Diagnoses and all orders for this visit:    Borderline personality disorder (HCC)    Moderate episode of recurrent major depressive disorder (HCC)    Generalized anxiety disorder with panic attacks        Current Outpatient Medications on File Prior to Visit   Medication Sig Dispense Refill    baclofen 10 mg tablet Take 1 tablet (10 mg total) by mouth 3 (three) times a day as needed for muscle spasms for up to 4 days 12 tablet 0    cariprazine (Vraylar) 1.5 MG capsule Take 1 capsule (1.5 mg total) by mouth daily 30 capsule 1    hydrOXYzine HCL (ATARAX) 50 mg tablet Take 1 tablet (50 mg total) by mouth every 6 (six) hours as needed for anxiety (For severe anxiety) 90 tablet 0    lidocaine (Lidoderm) 5 % Apply 1 patch topically over 12 hours daily Remove & Discard patch within 12 hours or as directed by MD 10 patch 0    LORazepam (ATIVAN) 1 mg tablet Take 1 tablet (1 mg total) by mouth 2 (two) times a day as needed for anxiety 60 tablet 0    nadolol (CORGARD) 40 mg tablet   0    OMEPRAZOLE PO 20 mg      Trintellix 20 MG tablet Take 1 tablet (20 mg total) by mouth in the morning 30 tablet 2     No current facility-administered medications on file prior to visit.       Psychotherapy Provided:     Individual psychotherapy provided: Yes  Supportive counseling provided.     HPI ROS Appetite Changes and Sleep:     She reports normal sleep, adequate appetite, low energy. Denies homicidal ideation, denies suicidal ideation    Review  Of Systems:      HPI ROS:               Medication Side Effects:  denies    Depression (10 worst): 5/10    Anxiety (10 worst): 4-6/10    Safety concerns (SI, HI, etc): Denies si and hi    Sleep: 7 hrs    Energy: Fair to low    Appetite: 2-3 meals    Weight Change: denies        Mental Status Evaluation:    Appearance Adequate hygiene and grooming   Behavior calm and cooperative   Mood anxious  Depression Scale - 5 of 10 (0 = No depression)  Anxiety Scale - 5 of 10 (0 = No anxiety)   Speech Normal rate and volume   Affect mood-congruent   Thought Processes Goal directed and coherent   Thought Content Does not verbalize delusional material   Associations Tightly connected   Perceptual Disturbances Denies hallucinations and does not appear to be responding to internal stimuli   Risk Potential Suicidal/Homicidal Ideation - No evidence of suicidal or homicidal ideation and patient does not verbalize suicidal or homicidal ideation  Risk of Violence - No evidence of risk for violence found on assessment  Risk of Self Mutilation - No evidence of risk for self mutilation found on assessment   Orientation oriented to person, place, time/date, and situation   Memory recent and remote memory grossly intact   Consciousness alert and awake   Attention/Concentration attention span and concentration are age appropriate   Insight intact   Judgement intact   Muscle Strength and Gait normal muscle strength and normal muscle tone, normal gait/station and normal balance   Motor Activity no abnormal movements   Language no difficulty naming common objects, no difficulty repeating a phrase, no difficulty writing a sentence   Fund of Knowledge adequate knowledge of current events  adequate fund of knowledge regarding past history  adequate fund of knowledge regarding vocabulary          Past Medical History:    Past Medical History:   Diagnosis Date    Anxiety disorder     Borderline personality disorder (HCC)     Depression     Portal  vein thrombosis     PTSD (post-traumatic stress disorder)         Past Surgical History:   Procedure Laterality Date    GASTRIC BYPASS      US GUIDED LIVER BIOPSY  6/30/2020     Allergies   Allergen Reactions    Augmentin [Amoxicillin-Pot Clavulanate] Abdominal Pain    Tamiflu [Oseltamivir] Diarrhea     Substance Abuse History:    Social History     Substance and Sexual Activity   Alcohol Use Never     Social History     Substance and Sexual Activity   Drug Use Never     Social History:    Social History     Socioeconomic History    Marital status: Single     Spouse name: Not on file    Number of children: Not on file    Years of education: Not on file    Highest education level: Not on file   Occupational History    Not on file   Tobacco Use    Smoking status: Never    Smokeless tobacco: Never   Substance and Sexual Activity    Alcohol use: Never    Drug use: Never    Sexual activity: Not on file   Other Topics Concern    Not on file   Social History Narrative    Not on file     Social Determinants of Health     Financial Resource Strain: Not on file   Food Insecurity: Not on file   Transportation Needs: Not on file   Physical Activity: Not on file   Stress: Not on file   Social Connections: Not on file   Intimate Partner Violence: Not on file   Housing Stability: Low Risk  (4/30/2024)    Received from [facility]    Housing Stability     Unstable Housing in the Last Year: Not on file     Family Psychiatric History:     Family History   Problem Relation Age of Onset    Hypertension Mother     Diabetes Mother     Crohn's disease Mother     No Known Problems Father      History Review:The following portions of the patient's history were reviewed and updated as appropriate: allergies, current medications, past family history, past medical history, past social history, past surgical history, and problem list     OBJECTIVE:     Vital signs in last 24 hours:    There were no vitals filed for this  visit.  Laboratory Results: Recent Labs (last 2 months):   No visits with results within 2 Month(s) from this visit.   Latest known visit with results is:   Admission on 01/17/2024, Discharged on 01/17/2024   Component Date Value    Color, UA 01/17/2024 Yellow     Clarity, UA 01/17/2024 Clear     Specific Gravity, UA 01/17/2024 1.020     pH, UA 01/17/2024 7.5     Leukocytes, UA 01/17/2024 Negative     Nitrite, UA 01/17/2024 Negative     Protein, UA 01/17/2024 Negative     Glucose, UA 01/17/2024 Negative     Ketones, UA 01/17/2024 Negative     Urobilinogen, UA 01/17/2024 1.0     Bilirubin, UA 01/17/2024 Negative     Occult Blood, UA 01/17/2024 Negative     EXT Preg Test, Ur 01/17/2024 Negative     Control 01/17/2024 Valid      I have personally reviewed all pertinent laboratory/tests results.    Suicide/Homicide Risk Assessment:    Risk of Harm to Self:  Protective Factors: no current suicidal ideation, access to mental health treatment, compliant with medications, compliant with mental health treatment, having a desire to be alive, having pets, resiliency  Based on today's assessment, Michaela presents the following risk of harm to self: minimal    Risk of Harm to Others:  Based on today's assessment, Michaela presents the following risk of harm to others: minimal    The following interventions are recommended: referral for psychotherapy    Medications Risks/Benefits:      Risks, Benefits And Possible Side Effects Of Medications:    Discussed risks and benefits of treatment with patient including risk of suicidality, serotonin syndrome, increased QTc interval and SIADH related to treatment with antidepressants; Risk of induction of manic symptoms in certain patient populations, risk of parkinsonian symptoms, metabolic syndrome, tardive dyskinesia and neuroleptic malignant syndrome related to treatment with antipsychotic medications, and risks of misuse, abuse or dependence, sedation and respiratory depression related to  treatment with benzodiazepine medications     Controlled Medication Discussion:     Michaela has been filling controlled prescriptions on time as prescribed according to [place] Prescription Drug Monitoring Program  Discussed with Michaela the risks of sedation, respiratory depression, impairment of ability to drive and potential for abuse and addiction related to treatment with benzodiazepine medications. She understands risk of treatment with benzodiazepine medications, agrees to not drive if feels impaired and agrees to take medications as prescribed  Discussed with Michaela Black Box warning on concurrent use of benzodiazepines and opioid medications including sedation, respiratory depression, coma and death. She understands the risk of treatment with benzodiazepines in addition to opioids and wants to continue taking those medications  Discussed with Michaela increased risk of impairment related to concurrent use of benzodiazepines and hypnotic medications including excessive sedation, psychomotor impairment and respiratory depression. She understands the risk of treatment with benzodiazepines in addition to hypnotic medications and wants to continue taking those medications    Treatment Plan:    Due for update/Updated:   yes  Last treatment plan done 5/30 .  Treatment Plan due on 11/30/24.    PEDRITO De Dios 05/30/24    This note was shared with patient.      Visit Time    Visit Start Time: 955  Visit Stop Time: 1020  Total Visit Duration:  25 minutes

## 2024-05-30 NOTE — BH TREATMENT PLAN
TREATMENT PLAN (Medication Management Only)        [facility]    Name and Date of Birth:  Michaela Goncalves 29 y.o. 1995  Date of Treatment Plan: May 30, 2024  Diagnosis/Diagnoses:    1. Moderate episode of recurrent major depressive disorder (HCC)    2. Borderline personality disorder (HCC)    3. Generalized anxiety disorder with panic attacks      Strengths/Personal Resources for Self-Care: taking medications as prescribed, ability to communicate needs, being resoureceful, self-reliance, work skills.  Area/Areas of need (in own words): mood swings, anger control  1. Long Term Goal: improve control of acceptable anxiety level.  Target Date:6 months - 11/30/2024  Person/Persons responsible for completion of goal: Michaela  2.  Short Term Objective (s) - How will we reach this goal?:   A. Provider new recommended medication/dosage changes and/or continue medication(s): continue current medications as prescribed.  B. Attend medication management appointments regularly.  C. Take psychiatric medications responsibly. Decrease Lorazepam usage slowly with goal to discontinue.   Target Date:6 months - 11/30/2024  Person/Persons Responsible for Completion of Goal: Michaela  Progress Towards Goals: starting treatment  Treatment Modality: medication management every 1 months  Review due 180 days from date of this plan: 6 months - 11/30/2024  Expected length of service: ongoing treatment  My Physician/PA/NP and I have developed this plan together and I agree to work on the goals and objectives. I understand the treatment goals that were developed for my treatment.

## 2024-06-04 ENCOUNTER — TELEPHONE (OUTPATIENT)
Dept: PSYCHIATRY | Facility: CLINIC | Age: 29
End: 2024-06-04

## 2024-06-04 NOTE — TELEPHONE ENCOUNTER
Left voicemail informing patient and/or parent/guardian of the Psych Encounter form needing to be signed as a requirement from the insurance company for billing purposes. Patient can access form via Svbtle and sign electronically.     Please make patient aware this form must be signed for each visit as a requirement to continue future visits with provider.

## 2024-06-12 ENCOUNTER — TELEPHONE (OUTPATIENT)
Dept: PSYCHIATRY | Facility: CLINIC | Age: 29
End: 2024-06-12

## 2024-06-12 NOTE — TELEPHONE ENCOUNTER
Left voicemail informing patient and/or parent/guardian of the Psych Encounter form needing to be signed as a requirement from the insurance company for billing purposes. Patient can access form via inEarth and sign electronically.     Please make patient aware this form must be signed for each visit as a requirement to continue future visits with provider.

## 2024-06-18 ENCOUNTER — TELEPHONE (OUTPATIENT)
Dept: PSYCHIATRY | Facility: CLINIC | Age: 29
End: 2024-06-18

## 2024-06-18 NOTE — TELEPHONE ENCOUNTER
Left voicemail informing patient and/or parent/guardian of the Psych Encounter form needing to be signed as a requirement from the insurance company for billing purposes. Patient can access form via Xeko and sign electronically.     Please make patient aware this form must be signed for each visit as a requirement to continue future visits with provider.

## 2024-07-09 DIAGNOSIS — F41.0 GENERALIZED ANXIETY DISORDER WITH PANIC ATTACKS: ICD-10-CM

## 2024-07-09 DIAGNOSIS — F41.1 GENERALIZED ANXIETY DISORDER WITH PANIC ATTACKS: ICD-10-CM

## 2024-07-09 RX ORDER — LORAZEPAM 1 MG/1
1.5 TABLET ORAL DAILY PRN
Qty: 45 TABLET | Refills: 0 | Status: SHIPPED | OUTPATIENT
Start: 2024-07-09

## 2024-08-05 ENCOUNTER — TELEPHONE (OUTPATIENT)
Dept: PSYCHIATRY | Facility: CLINIC | Age: 29
End: 2024-08-05

## 2024-08-15 ENCOUNTER — TELEPHONE (OUTPATIENT)
Dept: PSYCHIATRY | Facility: CLINIC | Age: 29
End: 2024-08-15

## 2024-08-15 NOTE — TELEPHONE ENCOUNTER
Left voicemail informing patient and/or parent/guardian of the Psych Encounter form needing to be signed as a requirement from the insurance company for billing purposes. Patient can access form via [employer] and sign electronically.     Please make patient aware this form must be signed for each visit as a requirement to continue future visits with provider.

## 2024-08-17 DIAGNOSIS — F33.1 MODERATE EPISODE OF RECURRENT MAJOR DEPRESSIVE DISORDER (HCC): ICD-10-CM

## 2024-08-18 RX ORDER — VORTIOXETINE 20 MG/1
20 TABLET, FILM COATED ORAL EVERY MORNING
Qty: 30 TABLET | Refills: 2 | Status: SHIPPED | OUTPATIENT
Start: 2024-08-18

## 2024-08-20 ENCOUNTER — TELEPHONE (OUTPATIENT)
Dept: PSYCHIATRY | Facility: CLINIC | Age: 29
End: 2024-08-20

## 2024-08-20 NOTE — TELEPHONE ENCOUNTER
Left voicemail informing patient and/or parent/guardian of the Psych Encounter form needing to be signed as a requirement from the insurance company for billing purposes. Patient can access form via [employer] and sign electronically.     Please make patient aware this form must be signed for each visit as a requirement to continue future visits with provider.

## 2024-08-26 ENCOUNTER — TELEPHONE (OUTPATIENT)
Age: 29
End: 2024-08-26

## 2024-08-26 NOTE — TELEPHONE ENCOUNTER
Patient called to say that she was trying to sign her Psych Encounter in [facility] was unable. Writer tried to send to her but it is not configured to be sent to her.

## 2024-09-06 ENCOUNTER — TELEMEDICINE (OUTPATIENT)
Dept: PSYCHIATRY | Facility: CLINIC | Age: 29
End: 2024-09-06

## 2024-09-06 DIAGNOSIS — F41.1 GENERALIZED ANXIETY DISORDER WITH PANIC ATTACKS: ICD-10-CM

## 2024-09-06 DIAGNOSIS — F41.0 GENERALIZED ANXIETY DISORDER WITH PANIC ATTACKS: ICD-10-CM

## 2024-09-06 DIAGNOSIS — F60.3 BORDERLINE PERSONALITY DISORDER (HCC): ICD-10-CM

## 2024-09-06 DIAGNOSIS — F33.1 MODERATE EPISODE OF RECURRENT MAJOR DEPRESSIVE DISORDER (HCC): Primary | ICD-10-CM

## 2024-09-06 NOTE — PSYCH
No Call. No Show. No Charge    Michaela Goncalves no showed 09/09/24 appointment , staff called and left message to reschedule appointment     Treatment Plan not due at this session.

## 2024-09-11 ENCOUNTER — TELEPHONE (OUTPATIENT)
Dept: PSYCHIATRY | Facility: CLINIC | Age: 29
End: 2024-09-11

## 2024-09-20 ENCOUNTER — TELEPHONE (OUTPATIENT)
Dept: PSYCHIATRY | Facility: CLINIC | Age: 29
End: 2024-09-20

## 2024-09-20 NOTE — TELEPHONE ENCOUNTER
Left voicemail informing patient and/or parent/guardian of the Psych Encounter form needing to be signed as a requirement from the insurance company for billing purposes. Patient can access form via [employer] and sign electronically.     Please make patient aware this form must be signed for each visit as a requirement to continue future visits with provider.

## 2024-09-27 ENCOUNTER — TELEPHONE (OUTPATIENT)
Dept: PSYCHIATRY | Facility: CLINIC | Age: 29
End: 2024-09-27

## 2024-09-27 NOTE — TELEPHONE ENCOUNTER
Left voicemail informing patient and/or parent/guardian of the Psych Encounter form needing to be signed as a requirement from the insurance company for billing purposes. Patient can access form via [employer] and sign electronically.     Please make patient aware this form must be signed for each visit as a requirement to continue future visits with provider.

## 2024-10-16 ENCOUNTER — TELEPHONE (OUTPATIENT)
Dept: PSYCHIATRY | Facility: CLINIC | Age: 29
End: 2024-10-16

## 2024-10-16 NOTE — TELEPHONE ENCOUNTER
Left voicemail informing patient and/or parent/guardian of the Psych Encounter form needing to be signed as a requirement from the insurance company for billing purposes. Patient can access form via [employer] and sign electronically.     Please make patient aware this form must be signed for each visit as a requirement to continue future visits with provider.

## 2024-10-22 ENCOUNTER — TELEPHONE (OUTPATIENT)
Dept: PSYCHIATRY | Facility: CLINIC | Age: 29
End: 2024-10-22

## 2024-10-22 NOTE — TELEPHONE ENCOUNTER
Unable to reach patient to inform patient of the Psych Encounter form needing to be signed as a requirement from the insurance company for billing purposes. If patients contacts office, please make patient aware that the form can be accessed via [employer] to sign electronically and must be signed for each visit as a requirement to continue future visits with provider.

## 2024-10-31 ENCOUNTER — OFFICE VISIT (OUTPATIENT)
Dept: PSYCHIATRY | Facility: CLINIC | Age: 29
End: 2024-10-31
Payer: COMMERCIAL

## 2024-10-31 ENCOUNTER — DOCUMENTATION (OUTPATIENT)
Dept: BEHAVIORAL/MENTAL HEALTH CLINIC | Facility: CLINIC | Age: 29
End: 2024-10-31

## 2024-10-31 DIAGNOSIS — F33.1 MODERATE EPISODE OF RECURRENT MAJOR DEPRESSIVE DISORDER (HCC): Primary | ICD-10-CM

## 2024-10-31 DIAGNOSIS — F41.1 GENERALIZED ANXIETY DISORDER WITH PANIC ATTACKS: ICD-10-CM

## 2024-10-31 DIAGNOSIS — F41.0 GENERALIZED ANXIETY DISORDER WITH PANIC ATTACKS: ICD-10-CM

## 2024-10-31 DIAGNOSIS — F60.3 BORDERLINE PERSONALITY DISORDER (HCC): ICD-10-CM

## 2024-10-31 PROCEDURE — 99214 OFFICE O/P EST MOD 30 MIN: CPT

## 2024-10-31 RX ORDER — OFLOXACIN 3 MG/ML
SOLUTION/ DROPS OPHTHALMIC
COMMUNITY
Start: 2024-08-05

## 2024-10-31 RX ORDER — ONDANSETRON 4 MG/1
TABLET, ORALLY DISINTEGRATING ORAL
COMMUNITY
Start: 2024-10-29

## 2024-10-31 RX ORDER — ARIPIPRAZOLE 2 MG/1
2 TABLET ORAL DAILY
Qty: 30 TABLET | Refills: 1 | Status: SHIPPED | OUTPATIENT
Start: 2024-10-31

## 2024-10-31 RX ORDER — HYDROXYZINE HYDROCHLORIDE 50 MG/1
50 TABLET, FILM COATED ORAL EVERY 6 HOURS PRN
Qty: 90 TABLET | Refills: 0 | Status: SHIPPED | OUTPATIENT
Start: 2024-10-31

## 2024-10-31 NOTE — BH TREATMENT PLAN
TREATMENT PLAN (Medication Management Only)        [facility]    Name and Date of Birth:  Michaela Goncalves 29 y.o. 1995  Date of Treatment Plan: October 31, 2024  Diagnosis/Diagnoses:    1. Moderate episode of recurrent major depressive disorder (HCC)    2. Generalized anxiety disorder with panic attacks    3. Borderline personality disorder (HCC)      Strengths/Personal Resources for Self-Care: taking medications as prescribed, ability to communicate needs, being resoureceful, self-reliance, work skills.  Area/Areas of need (in own words): anxiety symptoms, anger control  1. Long Term Goal: improve control of acceptable anxiety level.  Target Date:6 months - 4/30/2025  Person/Persons responsible for completion of goal: Michaela  2.  Short Term Objective (s) - How will we reach this goal?:   A. Provider new recommended medication/dosage changes and/or continue medication(s): continue current medications as prescribed. Initiate Abilify 2mg daily   B. Attend medication management appointments regularly.  C. Take psychiatric medications responsibly. Decrease Lorazepam usage slowly with goal to discontinue.   Target Date:6 months - 4/30/2025  Person/Persons Responsible for Completion of Goal: Michaela  Progress Towards Goals: starting treatment  Treatment Modality: medication management every 1 months  Review due 180 days from date of this plan: 6 months - 4/30/2025  Expected length of service: ongoing treatment  My Physician/PA/NP and I have developed this plan together and I agree to work on the goals and objectives. I understand the treatment goals that were developed for my treatment.

## 2024-10-31 NOTE — PROGRESS NOTES
met with Pt to give resources for individual therapy. [facility] services. Pt will call and check to see how long of a wait it will be. [facility].

## 2024-10-31 NOTE — PSYCH
Regular Visit    Assessment & Plan  Moderate episode of recurrent major depressive disorder (HCC)  Initiate Abilify 2 mg daily to augment antidepressant  Generalized anxiety disorder with panic attacks  Continue lorazepam as needed only, attempt to use Atarax and replacement  Borderline personality disorder (HCC)  Attempt to initiate psychotherapy                 Encounter provider PEDRITO De Dios    Provider located at    [facility]  [address] [facility] [address] [phone]    Recent Visits  No visits were found meeting these conditions.  Showing recent visits within past 7 days and meeting all other requirements  Today's Visits  Date Type Provider Dept   10/31/24 Office Visit PEDRITO De Dios  [facility]   Showing today's visits and meeting all other requirements  Future Appointments  No visits were found meeting these conditions.  Showing future appointments within next 150 days and meeting all other requirements       HPI     Current Outpatient Medications   Medication Sig Dispense Refill    baclofen 10 mg tablet Take 1 tablet (10 mg total) by mouth 3 (three) times a day as needed for muscle spasms for up to 4 days 12 tablet 0    cariprazine (Vraylar) 1.5 MG capsule Take 1 capsule (1.5 mg total) by mouth daily (Patient not taking: Reported on 5/30/2024) 30 capsule 1    hydrOXYzine HCL (ATARAX) 50 mg tablet Take 1 tablet (50 mg total) by mouth every 6 (six) hours as needed for anxiety (For severe anxiety) 90 tablet 0    lidocaine (Lidoderm) 5 % Apply 1 patch topically over 12 hours daily Remove & Discard patch within 12 hours or as directed by MD 10 patch 0    LORazepam (ATIVAN) 1 mg tablet Take 1.5 tablets (1.5 mg total) by mouth daily as needed for anxiety 45 tablet 0    medroxyPROGESTERone acetate (DEPO-PROVERA SYRINGE) 150 mg/mL injection use as directed at physician's office every 3 months      nadolol  (CORGARD) 40 mg tablet   0    OMEPRAZOLE PO 20 mg      Trintellix 20 MG tablet take 1 tablet by mouth every morning 30 tablet 2     No current facility-administered medications for this visit.       Review of Systems        MEDICATION MANAGEMENT NOTE        [facility]    Name and Date of Birth:  Michaela Goncalves 29 y.o. 1995 MRN: [id number]    Date of Visit: October 31, 2024    Allergies   Allergen Reactions    Augmentin [Amoxicillin-Pot Clavulanate] Abdominal Pain    Tamiflu [Oseltamivir] Diarrhea     SUBJECTIVE:    Michaela is seen today for a follow up for Major Depressive Disorder, PTSD, Generalized Anxiety Disorder, and Borderline Personality Disorder. She continues to experience on and off depressive symptoms since the last visit.  Patient reports being unable to  Vraylar due to insurance issues and medication being too expensive, we discussed other options to augment her antidepressant, we will trial Abilify 2 mg daily and replacement, side effects explained, patient verbalized understanding.  Continues to endorse chronic 4/10 depression with periods of significant dysphoria, periods of low energy, negative thoughts.  Stressors continue to be significant financial strain and a limited support system.  She was given resources for  today to be able to help with financial resources.  She remains on the wait list for over a year, will contact support staff to help facilitate therapy.  Anxiety continues to be moderate to high related to financial stress, continue to attempt to minimize lorazepam for as needed only and avoid using on a daily basis, we will attempt to decrease during next visit.  Mood was pleasant, constricted, appropriate to situation.  She reports adequate sleep and appetite. Denies SI/HI.    She denies any side effects from medications.    PLAN:     - Insurance declined Vraylar, initiate Abilify 2mg for antidepressant  augmentation   Patient education for Abilify completed including dizziness, sedation, GI distress, Akathisia, agitation, orthostatic hypotension, headache, and cardiovascular risks, metabolic syndrome, NMS, TD, EPS, Seizures, and others      - Continue Trintellix 20 mg daily for depression and anxiety management   PARQ completed including serotonin syndrome, SIADH, worsening depression, suicidality, induction of balwinder, GI upset, headaches, activation, sexual side effects, sedation, potential drug interactions, and others.      -Continue Lorazepam 1.5mg daily for severe anxiety only, limit to prn only   - utilize prn atarax 50mg in replacement for severe stressors and anxiety.     Discussed with patient the risks of sedation, respiratory depression, impairment of ability to drive and potential for abuse and addiction related to treatment with benzodiazepine medications. The patient understands risk of treatment with benzodiazepine medications, agrees to not drive if feels impaired and agrees to take medications as prescribed.         Aware of 24 hour and weekend coverage for urgent situations accessed by calling [facility] main practice number  Referral for individual psychotherapy    Diagnoses and all orders for this visit:    Moderate episode of recurrent major depressive disorder (HCC)    Generalized anxiety disorder with panic attacks    Borderline personality disorder (HCC)          Current Outpatient Medications on File Prior to Visit   Medication Sig Dispense Refill    baclofen 10 mg tablet Take 1 tablet (10 mg total) by mouth 3 (three) times a day as needed for muscle spasms for up to 4 days 12 tablet 0    cariprazine (Vraylar) 1.5 MG capsule Take 1 capsule (1.5 mg total) by mouth daily (Patient not taking: Reported on 5/30/2024) 30 capsule 1    hydrOXYzine HCL (ATARAX) 50 mg tablet Take 1 tablet (50 mg total) by mouth every 6 (six) hours as needed for anxiety (For severe anxiety) 90  tablet 0    lidocaine (Lidoderm) 5 % Apply 1 patch topically over 12 hours daily Remove & Discard patch within 12 hours or as directed by MD 10 patch 0    LORazepam (ATIVAN) 1 mg tablet Take 1.5 tablets (1.5 mg total) by mouth daily as needed for anxiety 45 tablet 0    medroxyPROGESTERone acetate (DEPO-PROVERA SYRINGE) 150 mg/mL injection use as directed at physician's office every 3 months      nadolol (CORGARD) 40 mg tablet   0    OMEPRAZOLE PO 20 mg      Trintellix 20 MG tablet take 1 tablet by mouth every morning 30 tablet 2     No current facility-administered medications on file prior to visit.         HPI ROS Appetite Changes and Sleep:     She reports normal sleep, adequate appetite, low energy. Denies homicidal ideation, denies suicidal ideation    Review Of Systems:      HPI ROS:               Medication Side Effects:  denies    Depression (10 worst): 4/10    Anxiety (10 worst): 5/10    Safety concerns (SI, HI, etc): Denies si and hi    Sleep: 6 hrs    Energy: Fair     Appetite: 2-3 meals    Weight Change: denies        Mental Status Evaluation:    Appearance Adequate hygiene and grooming   Behavior calm and cooperative   Mood anxious and depressed  Depression Scale - 4 of 10 (0 = No depression)  Anxiety Scale - 5 of 10 (0 = No anxiety)   Speech Normal rate and volume   Affect constricted   Thought Processes Goal directed and coherent   Thought Content Does not verbalize delusional material   Associations Tightly connected   Perceptual Disturbances Denies hallucinations and does not appear to be responding to internal stimuli   Risk Potential Suicidal/Homicidal Ideation - No evidence of suicidal or homicidal ideation and patient does not verbalize suicidal or homicidal ideation  Risk of Violence - No evidence of risk for violence found on assessment  Risk of Self Mutilation - No evidence of risk for self mutilation found on assessment   Orientation oriented to person, place, time/date and situation    Memory recent and remote memory grossly intact   Consciousness alert and awake   Attention/Concentration attention span and concentration are age appropriate   Insight intact   Judgement intact   Muscle Strength and Gait normal muscle strength and normal muscle tone, normal gait/station and normal balance   Motor Activity no abnormal movements   Language no difficulty naming common objects, no difficulty repeating a phrase, no difficulty writing a sentence   Fund of Knowledge adequate knowledge of current events  adequate fund of knowledge regarding past history  adequate fund of knowledge regarding vocabulary      Traumatic History Update:     New Onset of Abuse Since Last Encounter:   no  Traumatic Events Since Last Encounter:   no    Past Medical History:    Past Medical History:   Diagnosis Date    Anxiety disorder     Borderline personality disorder (HCC)     Depression     Portal vein thrombosis     PTSD (post-traumatic stress disorder)         Past Surgical History:   Procedure Laterality Date    GASTRIC BYPASS      US GUIDED LIVER BIOPSY  6/30/2020     Allergies   Allergen Reactions    Augmentin [Amoxicillin-Pot Clavulanate] Abdominal Pain    Tamiflu [Oseltamivir] Diarrhea     Substance Abuse History:    Social History     Substance and Sexual Activity   Alcohol Use Never     Social History     Substance and Sexual Activity   Drug Use Never     Social History:    Social History     Socioeconomic History    Marital status: Single     Spouse name: Not on file    Number of children: Not on file    Years of education: Not on file    Highest education level: Not on file   Occupational History    Not on file   Tobacco Use    Smoking status: Never    Smokeless tobacco: Never   Substance and Sexual Activity    Alcohol use: Never    Drug use: Never    Sexual activity: Not on file   Other Topics Concern    Not on file   Social History Narrative    Not on file     Social Determinants of Health     Financial Resource  Strain: Not on file   Food Insecurity: Not on file   Transportation Needs: Not on file   Physical Activity: Not on file   Stress: Not on file   Social Connections: Not on file   Intimate Partner Violence: Not At Risk (11/30/2023)    [facility]    Interpersonal Violence     Interpersonal Violence: No   Housing Stability: Low Risk  (4/30/2024)    Received from [facility]    Housing Stability     Unstable Housing in the Last Year: Not on file     Family Psychiatric History:     Family History   Problem Relation Age of Onset    Hypertension Mother     Diabetes Mother     Crohn's disease Mother     No Known Problems Father      History Review:The following portions of the patient's history were reviewed and updated as appropriate: allergies, current medications, past family history, past medical history, past social history, past surgical history and problem list     OBJECTIVE:     Vital signs in last 24 hours:    There were no vitals filed for this visit.  Laboratory Results:   Recent Labs (last 2 months):   No visits with results within 2 Month(s) from this visit.   Latest known visit with results is:   Admission on 01/17/2024, Discharged on 01/17/2024   Component Date Value    Color, UA 01/17/2024 Yellow     Clarity, UA 01/17/2024 Clear     Specific Gravity, UA 01/17/2024 1.020     pH, UA 01/17/2024 7.5     Leukocytes, UA 01/17/2024 Negative     Nitrite, UA 01/17/2024 Negative     Protein, UA 01/17/2024 Negative     Glucose, UA 01/17/2024 Negative     Ketones, UA 01/17/2024 Negative     Urobilinogen, UA 01/17/2024 1.0     Bilirubin, UA 01/17/2024 Negative     Occult Blood, UA 01/17/2024 Negative     EXT Preg Test, Ur 01/17/2024 Negative     Control 01/17/2024 Valid      I have personally reviewed all pertinent laboratory/tests results.    Suicide/Homicide Risk Assessment:    Risk of Harm to Self:  Protective Factors: no current suicidal ideation, access to  mental health treatment, compliant with medications, compliant with mental health treatment, connection to community, having a desire to be alive  Based on today's assessment, Michaela presents the following risk of harm to self: minimal    Risk of Harm to Others:  Based on today's assessment, Michaela presents the following risk of harm to others: minimal    The following interventions are recommended: referral for psychotherapy    Medications Risks/Benefits:      Risks, Benefits And Possible Side Effects Of Medications:    Discussed risks and benefits of treatment with patient including risk of suicidality, serotonin syndrome, increased QTc interval and SIADH related to treatment with antidepressants; Risk of induction of manic symptoms in certain patient populations, risk of parkinsonian symptoms, metabolic syndrome, tardive dyskinesia and neuroleptic malignant syndrome related to treatment with antipsychotic medications and risks of misuse, abuse or dependence, sedation and respiratory depression related to treatment with benzodiazepine medications     Controlled Medication Discussion:     Michaela has been filling controlled prescriptions on time as prescribed according to [place] Prescription Drug Monitoring Program  Discussed with Michaela the risks of sedation, respiratory depression, impairment of ability to drive and potential for abuse and addiction related to treatment with benzodiazepine medications. She understands risk of treatment with benzodiazepine medications, agrees to not drive if feels impaired and agrees to take medications as prescribed  Discussed with Michaela Black Box warning on concurrent use of benzodiazepines and opioid medications including sedation, respiratory depression, coma and death. She understands the risk of treatment with benzodiazepines in addition to opioids and wants to continue taking those medications  Discussed with Michaela increased risk of impairment related to concurrent use of  benzodiazepines and hypnotic medications including excessive sedation, psychomotor impairment and respiratory depression. She understands the risk of treatment with benzodiazepines in addition to hypnotic medications and wants to continue taking those medications    Treatment Plan:    Due for update/Updated:   no  Last treatment plan done 5/30, due 11/30/24    PEDRITO De Dios 10/31/24    Visit Time    Visit Start Time: 130  Visit Stop Time: 152  Total Visit Duration:  22 minutes

## 2024-11-14 ENCOUNTER — TELEPHONE (OUTPATIENT)
Dept: PSYCHIATRY | Facility: CLINIC | Age: 29
End: 2024-11-14

## 2024-11-14 NOTE — TELEPHONE ENCOUNTER
Left voicemail informing patient and/or parent/guardian of the Psych Encounter form needing to be signed as a requirement from the insurance company for billing purposes. Patient can access form via [employer] and sign electronically.     Please make patient aware this form must be signed for each visit as a requirement to continue future visits with provider.

## 2024-11-30 DIAGNOSIS — F33.1 MODERATE EPISODE OF RECURRENT MAJOR DEPRESSIVE DISORDER (HCC): ICD-10-CM

## 2024-12-02 RX ORDER — VORTIOXETINE 20 MG/1
20 TABLET, FILM COATED ORAL EVERY MORNING
Qty: 30 TABLET | Refills: 2 | Status: SHIPPED | OUTPATIENT
Start: 2024-12-02

## 2024-12-24 ENCOUNTER — TELEPHONE (OUTPATIENT)
Dept: PSYCHIATRY | Facility: CLINIC | Age: 29
End: 2024-12-24

## 2024-12-24 DIAGNOSIS — F33.1 MODERATE EPISODE OF RECURRENT MAJOR DEPRESSIVE DISORDER (HCC): ICD-10-CM

## 2024-12-24 RX ORDER — ARIPIPRAZOLE 2 MG/1
2 TABLET ORAL DAILY
Qty: 30 TABLET | Refills: 0 | Status: SHIPPED | OUTPATIENT
Start: 2024-12-24

## 2025-01-29 ENCOUNTER — DOCUMENTATION (OUTPATIENT)
Dept: BEHAVIORAL/MENTAL HEALTH CLINIC | Facility: CLINIC | Age: 30
End: 2025-01-29

## 2025-01-29 ENCOUNTER — OFFICE VISIT (OUTPATIENT)
Dept: PSYCHIATRY | Facility: CLINIC | Age: 30
End: 2025-01-29
Payer: COMMERCIAL

## 2025-01-29 DIAGNOSIS — F60.3 BORDERLINE PERSONALITY DISORDER (HCC): ICD-10-CM

## 2025-01-29 DIAGNOSIS — F41.1 GENERALIZED ANXIETY DISORDER WITH PANIC ATTACKS: ICD-10-CM

## 2025-01-29 DIAGNOSIS — F41.0 GENERALIZED ANXIETY DISORDER WITH PANIC ATTACKS: ICD-10-CM

## 2025-01-29 DIAGNOSIS — F33.1 MODERATE EPISODE OF RECURRENT MAJOR DEPRESSIVE DISORDER (HCC): Primary | ICD-10-CM

## 2025-01-29 PROCEDURE — 99214 OFFICE O/P EST MOD 30 MIN: CPT

## 2025-01-29 RX ORDER — ARIPIPRAZOLE 2 MG/1
2 TABLET ORAL DAILY
Qty: 30 TABLET | Refills: 1 | Status: SHIPPED | OUTPATIENT
Start: 2025-01-29

## 2025-01-29 RX ORDER — LORAZEPAM 1 MG/1
1 TABLET ORAL DAILY PRN
Start: 2025-01-29

## 2025-01-29 RX ORDER — HYDROXYZINE HYDROCHLORIDE 50 MG/1
50 TABLET, FILM COATED ORAL EVERY 6 HOURS PRN
Qty: 90 TABLET | Refills: 0 | Status: SHIPPED | OUTPATIENT
Start: 2025-01-29

## 2025-01-29 NOTE — PSYCH
Regular Visit    Assessment & Plan  Moderate episode of recurrent major depressive disorder (HCC)   - Continue Abilify 2mg for antidepressant augmentation   Patient education for Abilify completed including dizziness, sedation, GI distress, Akathisia, agitation, orthostatic hypotension, headache, and cardiovascular risks, metabolic syndrome, NMS, TD, EPS, Seizures, and others      - Continue Trintellix 20 mg daily for depression and anxiety management   PARQ completed including serotonin syndrome, SIADH, worsening depression, suicidality, induction of balwinder, GI upset, headaches, activation, sexual side effects, sedation, potential drug interactions, and others.        Generalized anxiety disorder with panic attacks  -Decrease Lorazepam 1mg daily for severe anxiety only, uses for panic attack only, no refill needed. Use prn atarax in replacement for frequent use.    - utilize prn atarax 50mg in replacement for severe stressors and anxiety.     Discussed with patient the risks of sedation, respiratory depression, impairment of ability to drive and potential for abuse and addiction related to treatment with benzodiazepine medications. The patient understands risk of treatment with benzodiazepine medications, agrees to not drive if feels impaired and agrees to take medications as prescribed.  Borderline personality disorder (HCC)  Recommend therapy                 Encounter provider PEDRITO De Dios    Provider located at    [address] [address] [phone]    Recent Visits  No visits were found meeting these conditions.  Showing recent visits within past 7 days and meeting all other requirements  Today's Visits  Date Type Provider Dept   01/29/25 Office Visit PEDRITO De Dios  [facility]   Showing today's visits and meeting all other requirements  Future Appointments  No visits were found  meeting these conditions.  Showing future appointments within next 150 days and meeting all other requirements       HPI     Current Outpatient Medications   Medication Sig Dispense Refill   • ARIPiprazole (ABILIFY) 2 mg tablet Take 1 tablet (2 mg total) by mouth daily 30 tablet 1   • hydrOXYzine HCL (ATARAX) 50 mg tablet Take 1 tablet (50 mg total) by mouth every 6 (six) hours as needed for anxiety (For severe anxiety) 90 tablet 0   • lidocaine (Lidoderm) 5 % Apply 1 patch topically over 12 hours daily Remove & Discard patch within 12 hours or as directed by MD 10 patch 0   • LORazepam (ATIVAN) 1 mg tablet Take 1 tablet (1 mg total) by mouth daily as needed for anxiety     • medroxyPROGESTERone acetate (DEPO-PROVERA SYRINGE) 150 mg/mL injection use as directed at physician's office every 3 months     • nadolol (CORGARD) 40 mg tablet   0   • ofloxacin (OCUFLOX) 0.3 % ophthalmic solution ADMINISTER 1 drop into right eye four times a day for 5 days     • OMEPRAZOLE PO 20 mg     • ondansetron (ZOFRAN-ODT) 4 mg disintegrating tablet      • Trintellix 20 MG tablet take 1 tablet by mouth every morning 30 tablet 2   • baclofen 10 mg tablet Take 1 tablet (10 mg total) by mouth 3 (three) times a day as needed for muscle spasms for up to 4 days 12 tablet 0     No current facility-administered medications for this visit.       Review of Systems        MEDICATION MANAGEMENT NOTE        [facility]    Name and Date of Birth:  Michaela Goncalves 29 y.o. 1995 MRN: [id number]    Date of Visit: January 29, 2025    Allergies   Allergen Reactions   • Augmentin [Amoxicillin-Pot Clavulanate] Abdominal Pain   • Nickel Other (See Comments)   • Tamiflu [Oseltamivir] Diarrhea     SUBJECTIVE:    Michaela is seen today for a follow up for Major Depressive Disorder, PTSD, Generalized Anxiety Disorder, and Borderline Personality Disorder. She continues to improve slowly since the last visit.   Patient is noticeably bright, pleasant, talkative during today's encounter with more optimism for the future.  Discusses her future plans with her significant other, potentially moving to [place] in a camper to start her life with her partner, is looking forward to getting out of her parents house eventually due to the chaos in the house.  She enjoyed talking about her hobbies including animal keeping.  She appears to be in improved spirits since initiating Abilify 2 mg daily to augment antidepressant, tolerating medication with no side effects or motor symptoms, will continue with no adjustments.  She has done well to utilize as needed Atarax for breakthrough anxiety instead of turning to as needed lorazepam, using lorazepam very infrequently to prevent panic only, no refill needed and hopeful to discontinue.  Anxiety waxes and wanes depending on her father's behavior, describes how unpredictable he can be especially when drinking.  Believes anxiety will be much more manageable when she leaves the household, continues to struggle with financial difficulty.  Reports adequate sleep and appetite.  Denies SI.      She denies any side effects from medications.    PLAN:     - Continue Abilify 2mg for antidepressant augmentation   Patient education for Abilify completed including dizziness, sedation, GI distress, Akathisia, agitation, orthostatic hypotension, headache, and cardiovascular risks, metabolic syndrome, NMS, TD, EPS, Seizures, and others      - Continue Trintellix 20 mg daily for depression and anxiety management   PARQ completed including serotonin syndrome, SIADH, worsening depression, suicidality, induction of balwinder, GI upset, headaches, activation, sexual side effects, sedation, potential drug interactions, and others.          -Decrease Lorazepam 1mg daily for severe anxiety only, uses for panic attack only, no refill needed. Use prn atarax in replacement for frequent use.    - utilize prn atarax 50mg in  replacement for severe stressors and anxiety.     Discussed with patient the risks of sedation, respiratory depression, impairment of ability to drive and potential for abuse and addiction related to treatment with benzodiazepine medications. The patient understands risk of treatment with benzodiazepine medications, agrees to not drive if feels impaired and agrees to take medications as prescribed.         Aware of 24 hour and weekend coverage for urgent situations accessed by calling [facility] main practice number  Referral for individual psychotherapy    Diagnoses and all orders for this visit:    Moderate episode of recurrent major depressive disorder (HCC)  -     ARIPiprazole (ABILIFY) 2 mg tablet; Take 1 tablet (2 mg total) by mouth daily    Generalized anxiety disorder with panic attacks  -     LORazepam (ATIVAN) 1 mg tablet; Take 1 tablet (1 mg total) by mouth daily as needed for anxiety  -     hydrOXYzine HCL (ATARAX) 50 mg tablet; Take 1 tablet (50 mg total) by mouth every 6 (six) hours as needed for anxiety (For severe anxiety)    Borderline personality disorder (HCC)          Current Outpatient Medications on File Prior to Visit   Medication Sig Dispense Refill   • lidocaine (Lidoderm) 5 % Apply 1 patch topically over 12 hours daily Remove & Discard patch within 12 hours or as directed by MD 10 patch 0   • medroxyPROGESTERone acetate (DEPO-PROVERA SYRINGE) 150 mg/mL injection use as directed at physician's office every 3 months     • nadolol (CORGARD) 40 mg tablet   0   • ofloxacin (OCUFLOX) 0.3 % ophthalmic solution ADMINISTER 1 drop into right eye four times a day for 5 days     • OMEPRAZOLE PO 20 mg     • ondansetron (ZOFRAN-ODT) 4 mg disintegrating tablet      • Trintellix 20 MG tablet take 1 tablet by mouth every morning 30 tablet 2   • [DISCONTINUED] ARIPiprazole (ABILIFY) 2 mg tablet Take 1 tablet (2 mg total) by mouth daily 30 tablet 0   • [DISCONTINUED] hydrOXYzine HCL  (ATARAX) 50 mg tablet Take 1 tablet (50 mg total) by mouth every 6 (six) hours as needed for anxiety (For severe anxiety) 90 tablet 0   • [DISCONTINUED] LORazepam (ATIVAN) 1 mg tablet Take 1.5 tablets (1.5 mg total) by mouth daily as needed for anxiety 45 tablet 0   • baclofen 10 mg tablet Take 1 tablet (10 mg total) by mouth 3 (three) times a day as needed for muscle spasms for up to 4 days 12 tablet 0     No current facility-administered medications on file prior to visit.         HPI ROS Appetite Changes and Sleep:     She reports normal sleep, adequate appetite, low energy. Denies homicidal ideation, denies suicidal ideation    Review Of Systems:      HPI ROS:               Medication Side Effects:  denies    Depression (10 worst): 3/10    Anxiety (10 worst): 3/10    Safety concerns (SI, HI, etc): Denies si and hi    Sleep: 6 hrs    Energy: Fair     Appetite: 2-3 meals    Weight Change: denies        Mental Status Evaluation:    Appearance Adequate hygiene and grooming   Behavior calm and cooperative   Mood anxious, depressed, and improving  Depression Scale - 3 of 10 (0 = No depression)  Anxiety Scale - 3 of 10 (0 = No anxiety)   Speech Normal rate and volume   Affect constricted   Thought Processes Goal directed and coherent   Thought Content Does not verbalize delusional material   Associations Tightly connected   Perceptual Disturbances Denies hallucinations and does not appear to be responding to internal stimuli   Risk Potential Suicidal/Homicidal Ideation - No evidence of suicidal or homicidal ideation and patient does not verbalize suicidal or homicidal ideation  Risk of Violence - No evidence of risk for violence found on assessment  Risk of Self Mutilation - No evidence of risk for self mutilation found on assessment   Orientation oriented to person, place, time/date and situation   Memory recent and remote memory grossly intact   Consciousness alert and awake   Attention/Concentration attention span  and concentration are age appropriate   Insight intact   Judgement intact   Muscle Strength and Gait normal muscle strength and normal muscle tone, normal gait/station and normal balance   Motor Activity no abnormal movements   Language no difficulty naming common objects, no difficulty repeating a phrase, no difficulty writing a sentence   Fund of Knowledge adequate knowledge of current events  adequate fund of knowledge regarding past history  adequate fund of knowledge regarding vocabulary      Traumatic History Update:     New Onset of Abuse Since Last Encounter:   no  Traumatic Events Since Last Encounter:   no    Past Medical History:    Past Medical History:   Diagnosis Date   • Anxiety disorder    • Borderline personality disorder (HCC)    • Depression    • Portal vein thrombosis    • PTSD (post-traumatic stress disorder)         Past Surgical History:   Procedure Laterality Date   • GASTRIC BYPASS     • US GUIDED LIVER BIOPSY  6/30/2020     Allergies   Allergen Reactions   • Augmentin [Amoxicillin-Pot Clavulanate] Abdominal Pain   • Nickel Other (See Comments)   • Tamiflu [Oseltamivir] Diarrhea     Substance Abuse History:    Social History     Substance and Sexual Activity   Alcohol Use Never     Social History     Substance and Sexual Activity   Drug Use Never     Social History:    Social History     Socioeconomic History   • Marital status: Single     Spouse name: Not on file   • Number of children: Not on file   • Years of education: Not on file   • Highest education level: Not on file   Occupational History   • Not on file   Tobacco Use   • Smoking status: Never   • Smokeless tobacco: Never   Substance and Sexual Activity   • Alcohol use: Never   • Drug use: Never   • Sexual activity: Not on file   Other Topics Concern   • Not on file   Social History Narrative   • Not on file     Social Drivers of Health     Financial Resource Strain: Not on file   Food Insecurity: Not on file   Transportation Needs:  Not on file   Physical Activity: Not on file   Stress: Not on file   Social Connections: Not on file   Intimate Partner Violence: Not At Risk (11/30/2023)    [facility]    Interpersonal Violence    • Interpersonal Violence: No   Housing Stability: Low Risk  (4/30/2024)    Received from [facility]    Housing Stability    • Unstable Housing in the Last Year: Not on file     Family Psychiatric History:     Family History   Problem Relation Age of Onset   • Hypertension Mother    • Diabetes Mother    • Crohn's disease Mother    • No Known Problems Father      History Review:The following portions of the patient's history were reviewed and updated as appropriate: allergies, current medications, past family history, past medical history, past social history, past surgical history and problem list     OBJECTIVE:     Vital signs in last 24 hours:    There were no vitals filed for this visit.  Laboratory Results:   Recent Labs (last 2 months):   No visits with results within 2 Month(s) from this visit.   Latest known visit with results is:   Admission on 01/17/2024, Discharged on 01/17/2024   Component Date Value   • Color, UA 01/17/2024 Yellow    • Clarity, UA 01/17/2024 Clear    • Specific Gravity, UA 01/17/2024 1.020    • pH, UA 01/17/2024 7.5    • Leukocytes, UA 01/17/2024 Negative    • Nitrite, UA 01/17/2024 Negative    • Protein, UA 01/17/2024 Negative    • Glucose, UA 01/17/2024 Negative    • Ketones, UA 01/17/2024 Negative    • Urobilinogen, UA 01/17/2024 1.0    • Bilirubin, UA 01/17/2024 Negative    • Occult Blood, UA 01/17/2024 Negative    • EXT Preg Test, Ur 01/17/2024 Negative    • Control 01/17/2024 Valid      I have personally reviewed all pertinent laboratory/tests results.    Suicide/Homicide Risk Assessment:    Risk of Harm to Self:  Protective Factors: no current suicidal ideation, access to mental health treatment, compliant with medications,  compliant with mental health treatment, connection to community, having a desire to be alive  Based on today's assessment, Michaela presents the following risk of harm to self: minimal    Risk of Harm to Others:  Based on today's assessment, Michaela presents the following risk of harm to others: minimal    The following interventions are recommended: referral for psychotherapy    Medications Risks/Benefits:      Risks, Benefits And Possible Side Effects Of Medications:    Discussed risks and benefits of treatment with patient including risk of suicidality, serotonin syndrome, increased QTc interval and SIADH related to treatment with antidepressants; Risk of induction of manic symptoms in certain patient populations, risk of parkinsonian symptoms, metabolic syndrome, tardive dyskinesia and neuroleptic malignant syndrome related to treatment with antipsychotic medications and risks of misuse, abuse or dependence, sedation and respiratory depression related to treatment with benzodiazepine medications     Controlled Medication Discussion:     Michaela has been filling controlled prescriptions on time as prescribed according to [place] Prescription Drug Monitoring Program  Discussed with Michaela the risks of sedation, respiratory depression, impairment of ability to drive and potential for abuse and addiction related to treatment with benzodiazepine medications. She understands risk of treatment with benzodiazepine medications, agrees to not drive if feels impaired and agrees to take medications as prescribed  Discussed with Michaela Black Box warning on concurrent use of benzodiazepines and opioid medications including sedation, respiratory depression, coma and death. She understands the risk of treatment with benzodiazepines in addition to opioids and wants to continue taking those medications  Discussed with Michaela increased risk of impairment related to concurrent use of benzodiazepines and hypnotic medications including  excessive sedation, psychomotor impairment and respiratory depression. She understands the risk of treatment with benzodiazepines in addition to hypnotic medications and wants to continue taking those medications    Treatment Plan:    Due for update/Updated:   no  Last treatment plan done 10/31, due 4/31/25    PEDRITO De Dios 01/29/25    Visit Time    Visit Start Time: 130  Visit Stop Time: 155  Total Visit Duration:  25 minutes

## 2025-01-29 NOTE — PROGRESS NOTES
Met with the Pt to give her the number for [facility] to check her status on the TCM wait list.

## 2025-03-22 DIAGNOSIS — F33.1 MODERATE EPISODE OF RECURRENT MAJOR DEPRESSIVE DISORDER (HCC): ICD-10-CM

## 2025-03-24 RX ORDER — VORTIOXETINE 20 MG/1
20 TABLET, FILM COATED ORAL EVERY MORNING
Qty: 30 TABLET | Refills: 2 | Status: SHIPPED | OUTPATIENT
Start: 2025-03-24

## 2025-03-25 RX ORDER — ARIPIPRAZOLE 2 MG/1
2 TABLET ORAL DAILY
Qty: 30 TABLET | Refills: 1 | Status: SHIPPED | OUTPATIENT
Start: 2025-03-25

## 2025-06-07 ENCOUNTER — APPOINTMENT (OUTPATIENT)
Dept: RADIOLOGY | Facility: CLINIC | Age: 30
End: 2025-06-07
Attending: PHYSICIAN ASSISTANT
Payer: COMMERCIAL

## 2025-06-07 ENCOUNTER — OFFICE VISIT (OUTPATIENT)
Dept: URGENT CARE | Facility: CLINIC | Age: 30
End: 2025-06-07
Payer: COMMERCIAL

## 2025-06-07 VITALS
WEIGHT: 133 LBS | HEART RATE: 79 BPM | SYSTOLIC BLOOD PRESSURE: 110 MMHG | RESPIRATION RATE: 18 BRPM | OXYGEN SATURATION: 96 % | BODY MASS INDEX: 25.97 KG/M2 | TEMPERATURE: 98.5 F | DIASTOLIC BLOOD PRESSURE: 64 MMHG

## 2025-06-07 DIAGNOSIS — S61.214D LACERATION OF RIGHT RING FINGER WITHOUT FOREIGN BODY WITHOUT DAMAGE TO NAIL, SUBSEQUENT ENCOUNTER: ICD-10-CM

## 2025-06-07 DIAGNOSIS — S61.214D LACERATION OF RIGHT RING FINGER WITHOUT FOREIGN BODY WITHOUT DAMAGE TO NAIL, SUBSEQUENT ENCOUNTER: Primary | ICD-10-CM

## 2025-06-07 PROCEDURE — 73140 X-RAY EXAM OF FINGER(S): CPT

## 2025-06-07 PROCEDURE — 99203 OFFICE O/P NEW LOW 30 MIN: CPT | Performed by: PHYSICIAN ASSISTANT

## 2025-06-07 RX ORDER — DOXYCYCLINE 100 MG/1
100 CAPSULE ORAL 2 TIMES DAILY
Qty: 14 CAPSULE | Refills: 0 | Status: SHIPPED | OUTPATIENT
Start: 2025-06-07 | End: 2025-06-14

## 2025-06-07 RX ORDER — PREDNISONE 20 MG/1
20 TABLET ORAL DAILY
Qty: 3 TABLET | Refills: 0 | Status: SHIPPED | OUTPATIENT
Start: 2025-06-07 | End: 2025-06-10

## 2025-06-07 NOTE — PATIENT INSTRUCTIONS
Take 1 tablet of doxycycline tonight and next tablet in the morning, after picking up prescription from pharmacy tomorrow take as instructed.  Keep wound clean and dressed, discussed importance of letting wound air out.  His skin does not heal well when covered wet and macerated.  With any progression or worsening of symptoms to be seen in ER.  Take prednisone as prescribed.  Can continue to ice the area.  Discussed my interpretation of x-ray which is no acute abnormalities.

## 2025-06-07 NOTE — PROGRESS NOTES
[facility]        NAME: Michaela Goncalves is a 30 y.o. female  : 1995    MRN: [id number]  DATE: 2025  TIME: 8:07 PM    Assessment and Plan   Laceration of right ring finger without foreign body without damage to nail, subsequent encounter [S61.214D]  1. Laceration of right ring finger without foreign body without damage to nail, subsequent encounter  XR finger right fourth digit-ring    doxycycline monohydrate (MONODOX) 100 mg capsule    predniSONE 20 mg tablet            Patient Instructions     Patient Instructions   Take 1 tablet of doxycycline tonight and next tablet in the morning, after picking up prescription from pharmacy tomorrow take as instructed.  Keep wound clean and dressed, discussed importance of letting wound air out.  His skin does not heal well when covered wet and macerated.  With any progression or worsening of symptoms to be seen in ER.  Take prednisone as prescribed.  Can continue to ice the area.      Follow up with PCP in 3-5 days.  Proceed to  ER if symptoms worsen.    Chief Complaint     Chief Complaint   Patient presents with    Hand Injury     Cut 4th digit left hand on mandolin 9 days ago.  Was seen at [facility] and was applied gel foam dressing.  Returned two days after and dressing was changed.  States her friend changed the dressing 3 days ado and applied neosporin dressing. Pain is radiating up arm.  Taking tylenol and motrin.  States she is up the past two days due to the pain         History of Present Illness       Patient is a 30-year-old female presenting today with right ring finger laceration x 1 week.  Patient notes she was seen and evaluated about a week ago for a cut to the tip of her right ring finger, was seen evaluated twice for this, states that she is still having persistent pain and discomfort and has now had some weeping and discharge from the area prompting their visit here today.  Has been struggling to change her dressings  routinely due to inability to look at area and also due to discomfort.  Denies fever, numbness, tingling.        Review of Systems   Review of Systems   Constitutional:  Negative for chills and fever.   HENT:  Negative for ear pain and sore throat.    Eyes:  Negative for pain and visual disturbance.   Respiratory:  Negative for cough and shortness of breath.    Cardiovascular:  Negative for chest pain and palpitations.   Gastrointestinal:  Negative for abdominal pain and vomiting.   Genitourinary:  Negative for dysuria and hematuria.   Musculoskeletal:  Negative for arthralgias and back pain.   Skin:  Positive for rash and wound.   Neurological:  Negative for seizures and syncope.   All other systems reviewed and are negative.        Current Medications     Current Medications[1]    Current Allergies     Allergies as of 06/07/2025 - Reviewed 06/07/2025   Allergen Reaction Noted    Augmentin [amoxicillin-pot clavulanate] Abdominal Pain 04/02/2023    Nickel Other (See Comments) 01/22/2025    Tamiflu [oseltamivir] Diarrhea 04/02/2023            The following portions of the patient's history were reviewed and updated as appropriate: allergies, current medications, past family history, past medical history, past social history, past surgical history and problem list.     Past Medical History[2]    Past Surgical History[3]    Family History[4]      Medications have been verified.        Objective   /64 (BP Location: Left arm)   Pulse 79   Temp 98.5 °F (36.9 °C) (Skin)   Resp 18   Wt 60.3 kg (133 lb)   LMP 05/28/2025   SpO2 96%   BMI 25.97 kg/m²        Physical Exam     Physical Exam  Vitals reviewed.   Constitutional:       General: She is not in acute distress.  HENT:      Head: Normocephalic.     Cardiovascular:      Rate and Rhythm: Normal rate.      Pulses: Normal pulses.   Pulmonary:      Effort: Pulmonary effort is normal.     Musculoskeletal:      Comments: Slight redness and swelling to distal aspect  of right ring finger, scant purulent discharge from wound of right ring finger, no streaking extending from area of redness, full ROM of right ring finger, no discomfort to palpation along anterior aspect of right ring finger, gross sensation of right ring finger intact     Skin:     General: Skin is warm.      Capillary Refill: Capillary refill takes less than 2 seconds.     Neurological:      General: No focal deficit present.      Mental Status: She is alert.                        [1]   Current Outpatient Medications:     ARIPiprazole (ABILIFY) 2 mg tablet, take 1 tablet by mouth once daily, Disp: 30 tablet, Rfl: 1    doxycycline monohydrate (MONODOX) 100 mg capsule, Take 1 capsule (100 mg total) by mouth 2 (two) times a day for 7 days, Disp: 14 capsule, Rfl: 0    hydrOXYzine HCL (ATARAX) 50 mg tablet, Take 1 tablet (50 mg total) by mouth every 6 (six) hours as needed for anxiety (For severe anxiety), Disp: 90 tablet, Rfl: 0    lidocaine (Lidoderm) 5 %, Apply 1 patch topically over 12 hours daily Remove & Discard patch within 12 hours or as directed by MD, Disp: 10 patch, Rfl: 0    LORazepam (ATIVAN) 1 mg tablet, Take 1 tablet (1 mg total) by mouth daily as needed for anxiety, Disp: , Rfl:     medroxyPROGESTERone acetate (DEPO-PROVERA SYRINGE) 150 mg/mL injection, , Disp: , Rfl:     nadolol (CORGARD) 40 mg tablet, , Disp: , Rfl: 0    ofloxacin (OCUFLOX) 0.3 % ophthalmic solution, , Disp: , Rfl:     OMEPRAZOLE PO, 20 mg, Disp: , Rfl:     ondansetron (ZOFRAN-ODT) 4 mg disintegrating tablet, , Disp: , Rfl:     predniSONE 20 mg tablet, Take 1 tablet (20 mg total) by mouth daily for 3 days, Disp: 3 tablet, Rfl: 0    Trintellix 20 MG tablet, take 1 tablet by mouth every morning, Disp: 30 tablet, Rfl: 2    baclofen 10 mg tablet, Take 1 tablet (10 mg total) by mouth 3 (three) times a day as needed for muscle spasms for up to 4 days, Disp: 12 tablet, Rfl: 0  [2]   Past Medical History:  Diagnosis Date    Anxiety disorder      Borderline personality disorder (HCC)     Depression     Portal vein thrombosis     PTSD (post-traumatic stress disorder)    [3]   Past Surgical History:  Procedure Laterality Date    GASTRIC BYPASS      US GUIDED LIVER BIOPSY  6/30/2020   [4]   Family History  Problem Relation Name Age of Onset    Hypertension Mother      Diabetes Mother      Crohn's disease Mother      No Known Problems Father

## 2025-06-08 ENCOUNTER — TELEPHONE (OUTPATIENT)
Dept: URGENT CARE | Facility: CLINIC | Age: 30
End: 2025-06-08

## 2025-06-08 NOTE — TELEPHONE ENCOUNTER
Attempted to call patient back about pain control for finger injury, left voicemail to return call

## 2025-06-12 ENCOUNTER — TELEPHONE (OUTPATIENT)
Age: 30
End: 2025-06-12

## 2025-06-12 NOTE — TELEPHONE ENCOUNTER
Pts mother called to make apt for pt and was advised that pt has to call and/or sign consent in documents in my chart.

## 2025-06-17 NOTE — TELEPHONE ENCOUNTER
Patients mother called to schedule an appointment for the patient. The patient was present at the time of the call to give verbal consent.    The writer sent over the communication consent form, via My Chart to be signed off on electronically, to assist with future calls.    The appointment has been established for:  7.2.25 @ 2:00pm    -Insurance verified

## 2025-07-02 ENCOUNTER — OFFICE VISIT (OUTPATIENT)
Dept: PSYCHIATRY | Facility: CLINIC | Age: 30
End: 2025-07-02
Payer: COMMERCIAL

## 2025-07-02 DIAGNOSIS — F41.0 GENERALIZED ANXIETY DISORDER WITH PANIC ATTACKS: ICD-10-CM

## 2025-07-02 DIAGNOSIS — F33.1 MODERATE EPISODE OF RECURRENT MAJOR DEPRESSIVE DISORDER (HCC): Primary | ICD-10-CM

## 2025-07-02 DIAGNOSIS — F41.1 GENERALIZED ANXIETY DISORDER WITH PANIC ATTACKS: ICD-10-CM

## 2025-07-02 DIAGNOSIS — F60.3 BORDERLINE PERSONALITY DISORDER (HCC): ICD-10-CM

## 2025-07-02 PROCEDURE — 99214 OFFICE O/P EST MOD 30 MIN: CPT

## 2025-07-02 RX ORDER — ARIPIPRAZOLE 2 MG/1
2 TABLET ORAL DAILY
Qty: 30 TABLET | Refills: 2 | Status: SHIPPED | OUTPATIENT
Start: 2025-07-02

## 2025-07-02 RX ORDER — MUPIROCIN 2 %
OINTMENT (GRAM) TOPICAL
COMMUNITY
Start: 2025-04-22

## 2025-07-02 RX ORDER — HYDROXYZINE HYDROCHLORIDE 50 MG/1
50 TABLET, FILM COATED ORAL EVERY 8 HOURS PRN
Qty: 90 TABLET | Refills: 2 | Status: SHIPPED | OUTPATIENT
Start: 2025-07-02

## 2025-07-02 RX ORDER — VORTIOXETINE 20 MG/1
20 TABLET, FILM COATED ORAL EVERY MORNING
Qty: 30 TABLET | Refills: 2 | Status: SHIPPED | OUTPATIENT
Start: 2025-07-02

## 2025-07-02 NOTE — PSYCH
MEDICATION MANAGEMENT NOTE    Name: Michaela Goncalves      : 1995      MRN: [id number]  Encounter Provider: PEDRITO De Dios  Encounter Date: 2025   Encounter department: [facility]    Insurance: Payor: [facility] / Plan: [facility] / Product Type: [facility] /      Reason for Visit: No chief complaint on file.  :  Assessment & Plan  Moderate episode of recurrent major depressive disorder (HCC)   - Continue Abilify 2mg for antidepressant augmentation   Patient education for Abilify completed including dizziness, sedation, GI distress, Akathisia, agitation, orthostatic hypotension, headache, and cardiovascular risks, metabolic syndrome, NMS, TD, EPS, Seizures, and others      - Continue Trintellix 20 mg daily for depression and anxiety management   PARQ completed including serotonin syndrome, SIADH, worsening depression, suicidality, induction of balwinder, GI upset, headaches, activation, sexual side effects, sedation, potential drug interactions, and others.       Orders:  •  ARIPiprazole (ABILIFY) 2 mg tablet; Take 1 tablet (2 mg total) by mouth daily  •  Trintellix 20 MG tablet; Take 1 tablet (20 mg total) by mouth every morning  •  Lipid panel; Future  •  Hemoglobin A1C; Future    Generalized anxiety disorder with panic attacks     - utilize prn atarax 50mg q8 hrs in replacement for  anxiety.       Orders:  •  hydrOXYzine HCL (ATARAX) 50 mg tablet; Take 1 tablet (50 mg total) by mouth every 8 (eight) hours as needed for anxiety (For severe anxiety)    Borderline personality disorder (HCC)  Recommend therapy           Treatment Recommendations:    Educated about diagnosis and treatment modalities. Verbalizes understanding and agreement with the treatment plan.  Discussed self monitoring of symptoms, and symptom monitoring tools.  Discussed medications and if treatment adjustment was needed or desired.  Aware of 24 hour  and weekend coverage for urgent situations accessed by calling [facility] main practice number  I am scheduling this patient out for greater than 3 months: No    Medications Risks/Benefits:      Risks, Benefits And Possible Side Effects Of Medications:    Risks, benefits, and possible side effects of medications explained to Michaela and she (or legal representative) verbalizes understanding and agreement for treatment.    Controlled Medication Discussion:     Not applicable      History of Present Illness     CC: Michaela presents today for follow up on MDD, MEI       Michaela is bright, pleasant, euthymic during today's encounter with mild anxiety.  States she is doing overall fine except for a few chronic stressors in her life which mostly revolve around family stress.  States she is doing her best to manage her stress and anxiety levels.  She has a supportive partner who she is very thankful for.  Family dog passed away back in March and they are grieving the loss.  Reports depression and anxiety 3/10 and currently controllable.  Occasional periods of dysphoria and worry, difficulty relaxing at night.  The as needed Atarax has prevented full-blown panic attacks and she believes medication is a much better fit than previous lorazepam, has done an adequate job weaning off ativan in the past.  Current Abilify, Trintellix continues to be effective for depression management.  She denies any motor symptoms or adverse effects.  No medication changes required today.  She is talkative, social, enjoyed talking about her passions and hobbies today.  Denies SI and HI.  Continues to recommend psychotherapy.    Med Compliance: yes    Since our last visit, overall symptoms have been stable.       HPI ROS:     Medication Side Effects: denies  Depression: 3 /10 (10 worst)  Anxiety: 3 /10 (10 worst)  Safety concerns (SI, HI, others): denies si and hi  Sleep: 7 hrs  Energy: fair  Appetite: 2 meals  Weight Change:  denies    Michaela denies any side effects from medications unless noted above.    Review Of Systems: A review of systems is obtained and is negative except for the pertinent positives listed in HPI/Subjective above.      Current Rating Scores:     Current PHQ-9   PHQ-2/9 Depression Screening           Areas of Improvement: reviewed in HPI/Subjective Section and reviewed in Assessment and Plan Section      Past Medical History[1]  Past Surgical History[2]  Allergies: Allergies[3]    Current Outpatient Medications   Medication Instructions   • ARIPiprazole (ABILIFY) 2 mg, Oral, Daily   • baclofen 10 mg, Oral, 3 times daily PRN   • hydrOXYzine HCL (ATARAX) 50 mg, Oral, Every 8 hours PRN   • lidocaine (Lidoderm) 5 % 1 patch, Topical, Daily, Remove & Discard patch within 12 hours or as directed by MD   • medroxyPROGESTERone acetate (DEPO-PROVERA SYRINGE) 150 mg/mL injection    • mupirocin (BACTROBAN) 2 % ointment apply topically three times a day for 5 days   • nadolol (CORGARD) 40 mg tablet No dose, route, or frequency recorded.   • ofloxacin (OCUFLOX) 0.3 % ophthalmic solution    • OMEPRAZOLE PO 20 mg   • ondansetron (ZOFRAN-ODT) 4 mg disintegrating tablet    • Trintellix 20 mg, Oral, Every morning        Substance Abuse History:    Tobacco, Alcohol and Drug Use History     Tobacco Use   • Smoking status: Never   • Smokeless tobacco: Never   Vaping Use   • Vaping status: Every Day   Substance Use Topics   • Alcohol use: Never   • Drug use: Yes     Types: Marijuana     Alcohol Use: Not At Risk (11/16/2020)    AUDIT-C    • Frequency of Alcohol Consumption: Never       Social History:    Social History     Socioeconomic History   • Marital status: Single     Spouse name: Not on file   • Number of children: Not on file   • Years of education: Not on file   • Highest education level: Not on file   Occupational History   • Not on file   Other Topics Concern   • Not on file   Social History Narrative   • Not on file         Family Psychiatric History:     Family History[4]    Medical History Reviewed by provider this encounter:  Tobacco  Allergies  Meds  Problems  Med Hx  Surg Hx  Fam Hx          Objective   LMP 05/28/2025      Mental Status Evaluation:    Appearance age appropriate, casually dressed   Behavior cooperative, calm   Speech normal rate, normal volume, normal pitch, spontaneous   Mood anxious   Affect normal range and intensity, appropriate   Thought Processes organized, goal directed   Thought Content no overt delusions   Perceptual Disturbances: no auditory hallucinations, no visual hallucinations   Abnormal Thoughts  Risk Potential Suicidal ideation - None  Homicidal ideation - None  Potential for aggression - No   Orientation oriented to person, place, time/date, and situation   Memory recent and remote memory grossly intact   Consciousness alert and awake   Attention Span Concentration Span attention span and concentration are age appropriate   Intellect appears to be of average intelligence   Insight intact   Judgement intact   Muscle Strength and  Gait normal muscle strength and normal muscle tone, normal gait and normal balance   Motor activity no abnormal movements   Language no difficulty naming common objects, no difficulty repeating a phrase, no difficulty writing a sentence   Fund of Knowledge adequate knowledge of current events  adequate fund of knowledge regarding past history  adequate fund of knowledge regarding vocabulary        Laboratory Results: I have personally reviewed all pertinent laboratory/tests results    Recent Labs (last 2 months):   No visits with results within 2 Month(s) from this visit.   Latest known visit with results is:   Admission on 01/17/2024, Discharged on 01/17/2024   Component Date Value   • Color,  01/17/2024 Yellow    • Clarity,  01/17/2024 Clear    • Specific Gravity,  01/17/2024 1.020    • pH,  01/17/2024 7.5    • Leukocytes,  01/17/2024 Negative    •  "Nitrite, UA 01/17/2024 Negative    • Protein, UA 01/17/2024 Negative    • Glucose, UA 01/17/2024 Negative    • Ketones, UA 01/17/2024 Negative    • Urobilinogen, UA 01/17/2024 1.0    • Bilirubin, UA 01/17/2024 Negative    • Occult Blood, UA 01/17/2024 Negative    • EXT Preg Test, Ur 01/17/2024 Negative    • Control 01/17/2024 Valid        Suicide/Homicide Risk Assessment:    Risk of Harm to Self:  Protective Factors: no current suicidal ideation, able to make plans for the future, access to mental health treatment, compliant with medications, compliant with mental health treatment, connection to community, future oriented, having a desire to be alive, having a sense of purpose or meaning in life, having pets, resiliency, responsibilities and duties to others  Based on today's assessment, Michaela presents the following risk of harm to self: minimal    Risk of Harm to Others:  Based on today's assessment, Michaela presents the following risk of harm to others: none    The following interventions are recommended: Referral for psychotherapy.    Psychotherapy Provided:     Individual psychotherapy provided: Yes    Medications, treatment progress and treatment plan reviewed with Michaela.  Medication changes discussed with Michaela.  Medication education provided to Michaela.    Treatment Plan:    Completed and signed during the session: Yes - with Michaela.    Goals: Progress towards Treatment Plan goals - Yes, progressing, as evidenced by subjective findings in HPI/Subjective Section and in Assessment and Plan Section    Depression Follow-up Plan Completed: No    Note Share:    This note was shared with patient.    Administrative Statements       Visit Time  Visit Start Time: 2  Visit Stop Time: 230  Total Visit Duration: 30 minutes    Portions of the record may have been created with voice recognition software. Occasional wrong word or \"sound a like\" substitutions may have occurred due to the inherent limitations of voice recognition " software. Read the chart carefully and recognize, using context, where substitutions have occurred.    PEDRITO De Dios 07/03/25         [1]  Past Medical History:  Diagnosis Date   • Anxiety disorder    • Borderline personality disorder (HCC)    • Depression    • Portal vein thrombosis    • PTSD (post-traumatic stress disorder)    [2]  Past Surgical History:  Procedure Laterality Date   • GASTRIC BYPASS     • US GUIDED LIVER BIOPSY  6/30/2020   [3]  Allergies  Allergen Reactions   • Augmentin [Amoxicillin-Pot Clavulanate] Abdominal Pain   • Nickel Other (See Comments)   • Tamiflu [Oseltamivir] Diarrhea   [4]  Family History  Problem Relation Name Age of Onset   • Hypertension Mother     • Diabetes Mother     • Crohn's disease Mother     • No Known Problems Father

## 2025-07-02 NOTE — BH TREATMENT PLAN
TREATMENT PLAN (Medication Management Only)        [facility]    Name and Date of Birth:  Michaela Goncalves 30 y.o. 1995  Date of Treatment Plan: July 2, 2025  Diagnosis/Diagnoses:    1. Moderate episode of recurrent major depressive disorder (HCC)    2. Generalized anxiety disorder with panic attacks    3. Borderline personality disorder (HCC)      Strengths/Personal Resources for Self-Care: taking medications as prescribed, ability to communicate needs, ability to listen, family ties, motivation for treatment, being resoureceful, self-reliance, work skills.  Area/Areas of need (in own words): anxiety symptoms  1. Long Term Goal: improve control of acceptable anxiety level.  Target Date:6 months - 1/2/2026  Person/Persons responsible for completion of goal: Michaela  2.  Short Term Objective (s) - How will we reach this goal?:   A. Provider new recommended medication/dosage changes and/or continue medication(s): continue current medications as prescribed. Continue meds  B. Attend medication management appointments regularly.  C. Take psychiatric medications responsibly.   Target Date:6 months - 1/2/2026  Person/Persons Responsible for Completion of Goal: Michaela  Progress Towards Goals: starting treatment  Treatment Modality: medication management every 1 months  Review due 180 days from date of this plan: 6 months - 1/2/2026  Expected length of service: ongoing treatment  My Physician/PA/NP and I have developed this plan together and I agree to work on the goals and objectives. I understand the treatment goals that were developed for my treatment.